# Patient Record
Sex: FEMALE | Race: WHITE | Employment: OTHER | URBAN - METROPOLITAN AREA
[De-identification: names, ages, dates, MRNs, and addresses within clinical notes are randomized per-mention and may not be internally consistent; named-entity substitution may affect disease eponyms.]

---

## 2018-05-28 PROBLEM — Z85.3 HISTORY OF BREAST CANCER: Status: ACTIVE | Noted: 2018-05-28

## 2019-02-22 ENCOUNTER — TELEPHONE (OUTPATIENT)
Dept: UROGYNECOLOGY | Facility: CLINIC | Age: 64
End: 2019-02-22

## 2019-02-22 NOTE — TELEPHONE ENCOUNTER
Spoke to pt, consult appointment scheduled 4/18 at 2p at the [facility] location, pt aware and verbalizes understanding.

## 2019-02-22 NOTE — TELEPHONE ENCOUNTER
----- Message from Leila Meyer sent at 2/22/2019 12:53 PM CST -----  Contact: Pt   Name of Who is Calling: PETER FORD [[id number]]      What is the request in detail: New patient requesting to see Dr. Briggs for a second opinion in regards to prolapse, states her [facility] doctor recommended the office. Please contact to further discuss and advise      Can the clinic reply by [facility]: No       What Number to Call Back if not in [facility]: [phone]

## 2019-04-18 ENCOUNTER — INITIAL CONSULT (OUTPATIENT)
Dept: UROGYNECOLOGY | Facility: CLINIC | Age: 64
End: 2019-04-18
Payer: COMMERCIAL

## 2019-04-18 VITALS
HEIGHT: 67 IN | SYSTOLIC BLOOD PRESSURE: 124 MMHG | BODY MASS INDEX: 22.66 KG/M2 | DIASTOLIC BLOOD PRESSURE: 82 MMHG | WEIGHT: 144.38 LBS

## 2019-04-18 DIAGNOSIS — N81.4 UTEROVAGINAL PROLAPSE: ICD-10-CM

## 2019-04-18 DIAGNOSIS — N81.6 RECTOCELE, FEMALE: ICD-10-CM

## 2019-04-18 DIAGNOSIS — R30.0 DYSURIA: Primary | ICD-10-CM

## 2019-04-18 DIAGNOSIS — N95.2 VAGINAL ATROPHY: ICD-10-CM

## 2019-04-18 DIAGNOSIS — R35.1 NOCTURIA MORE THAN TWICE PER NIGHT: ICD-10-CM

## 2019-04-18 DIAGNOSIS — R19.8 IRREGULAR BOWEL HABITS: ICD-10-CM

## 2019-04-18 DIAGNOSIS — N39.41 URGE URINARY INCONTINENCE: ICD-10-CM

## 2019-04-18 DIAGNOSIS — N81.11 CYSTOCELE, MIDLINE: ICD-10-CM

## 2019-04-18 PROCEDURE — 87086 URINE CULTURE/COLONY COUNT: CPT

## 2019-04-18 PROCEDURE — 57160 PR FIT/INSERT INTRAVAG SUPPORT DEVICE: ICD-10-PCS | Mod: S$GLB,,, | Performed by: OBSTETRICS & GYNECOLOGY

## 2019-04-18 PROCEDURE — 99205 PR OFFICE/OUTPT VISIT, NEW, LEVL V, 60-74 MIN: ICD-10-PCS | Mod: 25,S$GLB,, | Performed by: OBSTETRICS & GYNECOLOGY

## 2019-04-18 PROCEDURE — 99999 PR PBB SHADOW E&M-EST. PATIENT-LVL III: ICD-10-PCS | Mod: PBBFAC,,, | Performed by: OBSTETRICS & GYNECOLOGY

## 2019-04-18 PROCEDURE — 57160 INSERT PESSARY/OTHER DEVICE: CPT | Mod: S$GLB,,, | Performed by: OBSTETRICS & GYNECOLOGY

## 2019-04-18 PROCEDURE — 99205 OFFICE O/P NEW HI 60 MIN: CPT | Mod: 25,S$GLB,, | Performed by: OBSTETRICS & GYNECOLOGY

## 2019-04-18 PROCEDURE — 99999 PR PBB SHADOW E&M-EST. PATIENT-LVL III: CPT | Mod: PBBFAC,,, | Performed by: OBSTETRICS & GYNECOLOGY

## 2019-04-18 RX ORDER — DIPHENHYDRAMINE HCL 25 MG
CAPSULE ORAL
COMMUNITY
Start: 2015-10-11 | End: 2019-04-18 | Stop reason: SDUPTHER

## 2019-04-18 RX ORDER — ASPIRIN 81 MG/1
TABLET ORAL
COMMUNITY
Start: 2015-10-11 | End: 2019-04-18

## 2019-04-18 RX ORDER — ATORVASTATIN CALCIUM 20 MG/1
TABLET, FILM COATED ORAL
COMMUNITY
Start: 2015-10-11 | End: 2019-04-18

## 2019-04-18 NOTE — PROGRESS NOTES
[facility]   [address] [address]    Joanna Alvarez  [id number]  1955 April 18, 2019    Consulting Physician: Self, Anatoly   GYN: Shaye tena MD   Primary M.D.: Shaye Tena MD    Chief Complaint   Patient presents with    Consult     prolapse       HPI:     1)  UI:  (--) EDUAR. (+)  UUI (rare)+ increased urgency but then can't void.   (--) pads. Daytime frequency: Q 3 hours. Not hydrating enough.  Nocturia: Yes: 2/night (new).   (--) dysuria,  (+) micro hematuria--per report NEG eval at [facility] with [facility] (Dr. Zachary Melgar),  (--) frequent UTIs.  (--) complete bladder emptying. DV to help but sometimes still can't void.     2)  POP:  Present x several years.  below introitus.  Symptoms:(+)  Hip discomfort, pressure jairon with movement (cannot exercise as much).  Has been attending PT 5 times in May 2017 and Feb 2019.  Has been doing home exercises.  Not sure helping.   (--) vaginal bleeding. (--) vaginal discharge. (--) sexually active due to bulge.  (--) dyspareunia.  (--)  Vaginal dryness.  (--) vaginal estrogen use. H/o BRCA, not E2 sens.     3)  BM:  (+) constipation/straining. Has some IBS. Has some loose stool 2-3 times/month. Watches diet. Takes imodium for travel.  (--) chronic diarrhea. (--) hematochezia.  (--) fecal incontinence.  (+) fecal smearing/urgency.  (+) complete evacuation.     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     Cancer 2015    rt breast, DCIS    Cataracts, bilateral     Cystocele     Diverticulosis     Lipoma     Psoriasis    DCIS: s/p B mastectomy; neg LN; no post Tx; E2 neg    Past Surgical History  Past Surgical History:   Procedure Laterality Date    AUGMENTATION OF BREAST Bilateral 05/2015    BREAST BIOPSY Right 04/2018    BREAST BIOPSY Right 05/2015    dcis    BREAST BIOPSY Right 03/2016    BREAST SURGERY Bilateral 2015     bilateral mastectomy with implants gel implants    HERNIA REPAIR Left     LIPOMA RESECTION Right 2015    MASTECTOMY Bilateral 2015    with bilateral silicone gel implants   L inguinal hernia (mesh)    Hysterectomy: No    Past Ob History     x 4.  C/s x 0.    Largest infant weight: 8#6oz.   yes FAVD. yes episiotomy.  4th degree lac.      Gynecologic History  LMP: No LMP recorded. Patient is postmenopausal.  Age of menarche: 15 yo  Age of menopause: 56 yo  Menstrual history: h/o normal  Pap test: 2019, normal per report.  States she had 2 polyps removed  Per GYN this year--benign.  No  bleeding. History of abnormal paps: Yes - 20 yo--cryoTx resolved.  History of STIs:  No  Mammogram: has MRIs annually for h/o DCIS.  19 breast US: Bilateral post surgical changes and stable right breast post biopsy changes.   There is no sonographic evidence of malignancy.  Colonoscopy: Date of last: 2014.  Result: diverticulosis.  Repeat due:    DEXA:  Date of last: 19.  Result:  Bone mineral density measurements correspond to osteopenia overall category.  Fracture risk is moderate.  In the interval, both bone mineral density measurements are slightly decreased.  Repeat due:  Per PCP.     Family History  Family History   Problem Relation Age of Onset    Heart disease Father     Cancer Brother         bladder cancer    Heart disease Brother     Breast cancer Neg Hx     Ovarian cancer Neg Hx     Vaginal cancer Neg Hx     Endometrial cancer Neg Hx       Colon CA: No  Breast CA: No  GYN CA: No   CA: Yes - brother (bladder)    Social History  Social History     Tobacco Use   Smoking Status Never Smoker   Smokeless Tobacco Never Used     Social History     Substance and Sexual Activity   Alcohol Use Yes    Alcohol/week: 0.6 oz    Types: 1 Standard drinks or equivalent per week    Comment:  socially   .    Social History     Substance and Sexual Activity   Drug Use No     The patient is  ".  Resides in [place] [address].  Employment status: homemaker. Helps with grandchildren.     Allergies  Review of patient's allergies indicates:  No Known Allergies    Medications  Current Outpatient Medications on File Prior to Visit   Medication Sig Dispense Refill    calcium carbonate (CALCIUM 300 ORAL) Take by mouth.      pediatric multivitamin chewable tablet Take 1 tablet by mouth once daily.      [DISCONTINUED] aspirin (ECOTRIN) 81 MG EC tablet Take by mouth.      [DISCONTINUED] atorvastatin (LIPITOR) 20 MG tablet Take by mouth.      [DISCONTINUED] diphenhydrAMINE (BENADRYL) 25 mg capsule Take 25 mg by mouth every 6 (six) hours as needed for Itching.      [DISCONTINUED] diphenhydrAMINE (BENADRYL) 25 mg capsule Take by mouth.      [DISCONTINUED] meloxicam (MOBIC) 15 MG tablet Take 1 tablet (15 mg total) by mouth once daily. 30 tablet 1     No current facility-administered medications on file prior to visit.        Review of Systems A 14 point ROS was reviewed with pertinent positives as noted above in the history of present illness.      Constitutional: negative  Eyes: negative  Endocrine: negative  Gastrointestinal: negative  Cardiovascular: negative  Respiratory: negative  Allergic/Immunologic: negative  Integumentary: negative  Psychiatric: negative  Musculoskeletal: negative   Ear/Nose/Throat: negative  Neurologic: negative  Genitourinary: SEE HPI  Hematologic/Lymphatic: negative   Breast: negative    Urogynecologic Exam  /82 (BP Location: Right arm, Patient Position: Sitting, BP Method: Medium (Manual))   Ht 5' 7" (1.702 m)   Wt 65.5 kg (144 lb 6.4 oz)   BMI 22.62 kg/m²     GENERAL APPEARANCE:  The patient is well-developed, well-nourished.  Neck:  Supple with no thyromegaly, no carotid bruits.  Heart:  Regular rate and rhythm, no murmurs, rubs or gallops.  Lungs:  Clear.  No CVA tenderness.  Abdomen:  Soft, nontender, nondistended, no hepatosplenomegaly.  Incisions:  " none    PELVIC:    External genitalia:  Normal Bartholins, Skenes and labia bilaterally.    Urethra:  No caruncle, diverticulum or masses.  (+) hypermobility.    Vagina:  Atrophy (--) , no bladder masses or tender, no discharge.    Cervix:  normal appearance  Uterus: normal size, contour, position, consistency, mobility, non-tender  Adnexa: Not palpable.    POP-Q:  Aa +3; Ba +3; C 0; Ap -1; Bp -1; D -4.  Genital hiatus 4, perineal body 2, total vaginal length 10-11.    NEUROLOGIC:  Cranial nerves 2 through 12 intact.  Strength 5/5.  DTRs 2+ lower extremities.  S2 through 4 normal.  Sacral reflexes intact.    EXT: RODRIGUEZ, 2+ pulses bilaterally, no C/C/E    COUGH STRESS TEST:  negative  KEGEL: 1 /5    RECTAL:    External:  Normal, (--) hemorrhoids, (--) dovetailing.   Internal:  deferred    PVR: 50 mL    The patient was fit with #4 ring + support pessary.  She was able to tolerate the device comfortabley with bending, squatting, valsalva.  She was able to demonstrate independent removal and placement.  She tolerated the procedure well.        Impression    1. Dysuria    2. Cystocele, midline    3. Rectocele, female    4. Uterovaginal prolapse    5. Urge urinary incontinence    6. Nocturia more than twice per night    7. Irregular bowel habits    8. Vaginal atrophy        Initial Plan  The patient was counseled regarding these issues. The patient was given a summary sheet containing each of these issues with possible options for evaluation and management. When appropriate, we also reviewed computer-generated diagrams specific to their diagnoses..  All questions were addressed to the patient's satisfaction.    1) stage 3 cystocele, stage 2 uterine prolapse/rectocele:  --discussed pathophysiology  --trial of pessary    PESSARY CARE: Remove pessary as frequently as nightly and as infrequently as every 4 weeks.  Remove before intercourse.  May need to remove before bowel movements if straining dislodges.   Wash with soap and  water (no ).  Replace using small amount of water-based lubricant (like KY jelly) at end being introduced into vagina.      --surgical option: laparoscopic/robotic hysterectomy, possible removal of tubes/ovaries, sacral colpopexy  --given mesh information  --if surgery: need to do bladder testing (urodynamics) to see if need sling for hidden stress leakage  --if surgery: pelvic US to define anatomy, preop visit with PCP, preop labs    2)  Nocturia (nighttime urination):  --see if pessary helps  --stop fluids 2 hours before bed/no water by bed.  If have leg swelling:  Elevate feet above chest x 1 hour before bed to get excess fluid off.  Can also use support hose (knee highs).      3)  Urge incontinence, rare:  --urine C&S  --Empty bladder every 3 hours.  Empty well: wait a minute, lean forward on toilet.    --Avoid dietary irritants (see sheet).  Keep diary x 3-5 days to determine your irritants.  --continue PT exercises at home:  Vivian Velásquez:  [facility] in [place] (p) [phone].    --URGE: consider medication in future. Takes 2-4 weeks to see if will have effect.  For dry mouth: get sour, sugar free lozenge or gum.    --if surgery: need to do bladder testing (urodynamics) to see if need sling for hidden stress leakage    4)  Irritable bowel, intermittent constipation/diarrhea:  --keep food diary; avoid triggers  --look at diet for exacerbating foods and decrease; keep diary (see handout)  --work on stress control; this can affect bowel movements: cognitive behavioral therapy  --increase hydration: drink 3-4 bottles of water/day in addition to what you drink with meals  --watch caffeine intake; make sure to replace lost water after/before drinking caffeine  --get regular exercise  --increase fiber: Start daily fiber.  Take 1 tsp of fiber powder (psyllium or other sugar-free powder).  Mix in 8 oz of water.  Take x 3-5 days.  Then, increase fiber by 1 tsp every 3-5 days until stool is  easy to pass.  Stop and continue at that dose.   Do not exceed 6 tsps/day. GOAL:  More well-formed stool (one continuous well-formed piece vs. Pellets vs loose) and minimize straining with initiation.    --consider daily magnesium oxide 400 IU or mg a day  --start daily probiotic pill (lactobacillis, acidophilus)--any brand ok  --use stool softener (ducosate sodium) 1-2 times/day  --if having trouble completing evacuation:  Get small footstool (less than 6 inches) or large book and place feet on when using bathroom   --consider PT, amitiza if refractory; consider evaluation for low motility syndrome if refractory  --rectocele: stage 2    5)  Vaginal atrophy (dryness): Use REPLENS or REFRESH OTC: 1/2 applicator full in vagina twice a week.    --consider use of vaginal estrogen if ok with oncology (h/o DCIS, E2 neg)    6)  Let us know if need anything else or would like surgery.     Approximately 60 min were spent in consult, 75 % in discussion.  Patient's  was present for entire discussion.    Thank you for requesting consultation of your patient.  I look forward to participating in their care.    Arianna Briggs  Female Pelvic Medicine and Reconstructive Surgery  [facility] [place], [place]

## 2019-04-18 NOTE — PATIENT INSTRUCTIONS
Bladder Irritants  Certain foods and drinks have been associated with worsening symptoms of urinary frequency, urgency, urge incontinence, or bladder pain. If you suffer from any of these conditions, you may wish to try eliminating one or more of these foods from your diet and see if your symptoms improve. If bladder symptoms are related to dietary factors, strict adherence to a diet thateliminates the food should bring marked relief in 10 days. Once you are feeling better, you can begin to add foods back into your diet, one at a time. If symptoms return, you will be able to identify the irritant. As you add foods back to your diet it is very important that you drink significant amounts of water.    -----------------------------------------------------------------------------------------------  List of Common Bladder Irritants*  Alcoholic beverages  Apples and apple juice  Cantaloupe  Carbonated beverages  Chili and spicy foods  Chocolate  Citrus fruit  Coffee (including decaffeinated)  Cranberries and cranberry juice  Grapes  Guava  Milk Products: milk, cheese, cottage cheese, yogurt, ice cream  Peaches  Pineapple  Plums  Strawberries  Sugar especially artificial sweeteners, saccharin, aspartame, corn sweeteners, honey, fructose, sucrose, lactose  Tea  Tomatoes and tomato juice  Vitamin B complex  Vinegar  *Most people are not sensitive to ALL of these products; your goal is to find the foods that make YOUR symptoms worse.  ---------------------------------------------------------------------------------------------------    Low-acid fruit substitutions include apricots, papaya, pears and watermelon. Coffee drinkers can drink Kava or other lowacid instant drinks. Tea drinkers can substitute non-citrus herbal and sun brewed teas. Calcium carbonate co-buffered with calcium ascorbate can be substituted for Vitamin C. Prelief is a dietary supplement that works as an acid blocker for the bladder.    Where to get more  information:        Overcoming Bladder Disorders by Cookie Shepard and Chrissie Iniguez, 1990        You Dont Have to Live with Cystitis! By Raegan Cornell, 1988  · http://www.urologymanagement.org/oab    ------------------------------------------------------------------------------    Fiber Information Sheet  Your doctor has recommended that you follow a high fiber diet. The addition of fiber to your diet can make an enormous difference in your bowel control and regularity. Fiber helps people whether they lose stool or have trouble with constipation. Fiber works by bulking the stool and keeping it formed, yet making the movement soft and easy to pass. Fiber helps keep moisture within the stool so that neither diarrhea nor hard stool occurs. Fiber makes the bowels work more regularly, but it is not a laxative. An additional bonus from eating a high fiber diet is that your risk of cancer is reduced, too.    Most of us eat some high fiber foods already, but nearly all of us do not eat the necessary amount. For example, a slice of whole wheat bread contains only about 10% of the daily recommended amount of fiber. This means if you are relying on only whole wheat bread to meet the recommended fiber requirements, you would need to eat  between 10-18 slices every day! Please note that fiber is NOT in any meat or dairy product. It is only found in grains, vegetables and fruits. The recommended daily fiber intake is 20-25 grams. Foods having high fiber content include:     Fiber One Cereal, ½ cup 13.0 g   Maria beans, ¾ cup 10.4 g   Wheat bran cereal, 1 oz 10.0 g   Kidney beans, ¾ cup 9.3 g   All Bran Cereal, ½ cup 6.0 g   Oat Bran Cereal, hot, 1 oz 4.0 g   Banana, 1medium 3.8 g   Canned pears, ½ cup 3.7 g   3 prunes or ¼ cup raisins 3.5 g   Whole Wheat Total, 1 cup 3.0 g   Carrots, ½ cup 3.2 g   Apple, small 2.8 g   Broccoli, ½ cup 2.8 g   Cauliflower, ½ cup 2.6 g   Oatmeal, 1 oz 2.5 g    Whole Wheat Toast 2.0 g   Cheerios, 1 1/3 cup 2.0 g   Baked potato with skin 2.0 g   Corn, ½ cup 1.9 g   Popcorn, 3 cups 1.9 g   Orange, medium 1.9 g   Granola bar 1.0 g   Lettuce, ½ cup 0.9 g    If you dont think that you can get enough fiber through your everyday diet, there are many good fiber supplements you can take along with eating your high fiber diet. Some of these are: Metamucil (1 heaping teaspoon or 1-2 wafers), Citrucel (1 tablespoon), Fiberall (1-2 wafers or 1 teaspoon), Perdium (2 rounded teaspoons) and 1-2 teaspoons unprocessed bran (to mix with foods)    You may need to use the fiber supplement up to 3-4 times daily to produce normal elimination. Please follow specific package directions or call us for help in regulating the dose. You may notice some bloating and/or increased gas at first. These symptoms can be relieved by adding fiber to your diet slowly. Once your body gets used to this increased fiber, these symptoms will go away.   -0------------------------------------------------------------------------------    1) stage 3 cystocele, stage 2 uterine prolapse/rectocele:  --discussed pathophysiology  --trial of pessary    PESSARY CARE: Remove pessary as frequently as nightly and as infrequently as every 4 weeks.  Remove before intercourse.  May need to remove before bowel movements if straining dislodges.   Wash with soap and water (no ).  Replace using small amount of water-based lubricant (like KY jelly) at end being introduced into vagina.      --surgical option: laparoscopic/robotic hysterectomy, possible removal of tubes/ovaries, sacral colpopexy  --given mesh information  --if surgery: need to do bladder testing (urodynamics) to see if need sling for hidden stress leakage  --if surgery: pelvic US to define anatomy, preop visit with PCP, preop labs    2)  Nocturia (nighttime urination):  --see if pessary helps  --stop fluids 2 hours before bed/no water by bed.  If have leg  swelling:  Elevate feet above chest x 1 hour before bed to get excess fluid off.  Can also use support hose (knee highs).      3)  Urge incontinence, rare:  --urine C&S  --Empty bladder every 3 hours.  Empty well: wait a minute, lean forward on toilet.    --Avoid dietary irritants (see sheet).  Keep diary x 3-5 days to determine your irritants.  --continue PT exercises at home:  Vivian Velásquez:  [facility] in [place] (p) [phone].    --URGE: consider medication in future. Takes 2-4 weeks to see if will have effect.  For dry mouth: get sour, sugar free lozenge or gum.    --if surgery: need to do bladder testing (urodynamics) to see if need sling for hidden stress leakage    4)  Irritable bowel, intermittent constipation/diarrhea:  --keep food diary; avoid triggers  --look at diet for exacerbating foods and decrease; keep diary (see handout)  --work on stress control; this can affect bowel movements: cognitive behavioral therapy  --increase hydration: drink 3-4 bottles of water/day in addition to what you drink with meals  --watch caffeine intake; make sure to replace lost water after/before drinking caffeine  --get regular exercise  --increase fiber: Start daily fiber.  Take 1 tsp of fiber powder (psyllium or other sugar-free powder).  Mix in 8 oz of water.  Take x 3-5 days.  Then, increase fiber by 1 tsp every 3-5 days until stool is easy to pass.  Stop and continue at that dose.   Do not exceed 6 tsps/day. GOAL:  More well-formed stool (one continuous well-formed piece vs. Pellets vs loose) and minimize straining with initiation.    --consider daily magnesium oxide 400 IU or mg a day  --start daily probiotic pill (lactobacillis, acidophilus)--any brand ok  --use stool softener (ducosate sodium) 1-2 times/day  --if having trouble completing evacuation:  Get small footstool (less than 6 inches) or large book and place feet on when using bathroom   --consider PT, amitiza if refractory; consider  evaluation for low motility syndrome if refractory  --rectocele: stage 2    5)  Vaginal atrophy (dryness): Use REPLENS or REFRESH OTC: 1/2 applicator full in vagina twice a week.    --consider use of vaginal estrogen if ok with oncology (h/o DCIS, E2 neg)    6)  Let us know if need anything else or would like surgery.

## 2019-04-20 LAB — BACTERIA UR CULT: NO GROWTH

## 2021-01-22 ENCOUNTER — PATIENT MESSAGE (OUTPATIENT)
Dept: ADMINISTRATIVE | Facility: OTHER | Age: 66
End: 2021-01-22

## 2021-03-15 ENCOUNTER — TELEPHONE (OUTPATIENT)
Dept: HEMATOLOGY/ONCOLOGY | Facility: CLINIC | Age: 66
End: 2021-03-15

## 2021-04-28 ENCOUNTER — OFFICE VISIT (OUTPATIENT)
Dept: HEMATOLOGY/ONCOLOGY | Facility: CLINIC | Age: 66
End: 2021-04-28
Payer: MEDICARE

## 2021-04-28 VITALS
HEART RATE: 65 BPM | WEIGHT: 142.63 LBS | SYSTOLIC BLOOD PRESSURE: 104 MMHG | OXYGEN SATURATION: 97 % | BODY MASS INDEX: 22.39 KG/M2 | TEMPERATURE: 99 F | HEIGHT: 67 IN | RESPIRATION RATE: 18 BRPM | DIASTOLIC BLOOD PRESSURE: 50 MMHG

## 2021-04-28 DIAGNOSIS — D05.11 DUCTAL CARCINOMA IN SITU (DCIS) OF RIGHT BREAST: Primary | ICD-10-CM

## 2021-04-28 DIAGNOSIS — Z78.0 POST-MENOPAUSAL: ICD-10-CM

## 2021-04-28 DIAGNOSIS — Z90.13 H/O BILATERAL MASTECTOMY: ICD-10-CM

## 2021-04-28 PROCEDURE — 99204 PR OFFICE/OUTPT VISIT, NEW, LEVL IV, 45-59 MIN: ICD-10-PCS | Mod: S$PBB,,, | Performed by: INTERNAL MEDICINE

## 2021-04-28 PROCEDURE — 99213 OFFICE O/P EST LOW 20 MIN: CPT | Mod: PBBFAC,PN | Performed by: INTERNAL MEDICINE

## 2021-04-28 PROCEDURE — 99204 OFFICE O/P NEW MOD 45 MIN: CPT | Mod: S$PBB,,, | Performed by: INTERNAL MEDICINE

## 2021-04-28 PROCEDURE — 99999 PR PBB SHADOW E&M-EST. PATIENT-LVL III: ICD-10-PCS | Mod: PBBFAC,,, | Performed by: INTERNAL MEDICINE

## 2021-04-28 PROCEDURE — 99999 PR PBB SHADOW E&M-EST. PATIENT-LVL III: CPT | Mod: PBBFAC,,, | Performed by: INTERNAL MEDICINE

## 2021-04-28 RX ORDER — METOPROLOL TARTRATE 25 MG/1
TABLET, FILM COATED ORAL
COMMUNITY
Start: 2021-02-08

## 2021-04-28 RX ORDER — METOPROLOL SUCCINATE 25 MG/1
TABLET, EXTENDED RELEASE ORAL
COMMUNITY
Start: 2021-01-25

## 2021-04-28 RX ORDER — PREGABALIN 75 MG/1
75 CAPSULE ORAL
COMMUNITY
Start: 2020-09-03 | End: 2021-09-03

## 2021-04-28 RX ORDER — NITROFURANTOIN (MACROCRYSTALS) 100 MG/1
100 CAPSULE ORAL
COMMUNITY

## 2021-04-28 RX ORDER — APIXABAN 5 MG/1
5 TABLET, FILM COATED ORAL 2 TIMES DAILY
COMMUNITY
Start: 2021-03-24

## 2021-04-28 RX ORDER — FLECAINIDE ACETATE 100 MG/1
100 TABLET ORAL EVERY 12 HOURS
COMMUNITY
Start: 2021-01-25

## 2021-04-28 RX ORDER — ASPIRIN 325 MG
325 TABLET, DELAYED RELEASE (ENTERIC COATED) ORAL
COMMUNITY
Start: 2020-09-02 | End: 2021-09-02

## 2021-04-28 RX ORDER — AZITHROMYCIN 250 MG/1
TABLET, FILM COATED ORAL
COMMUNITY
Start: 2021-01-30

## 2021-05-10 ENCOUNTER — PATIENT MESSAGE (OUTPATIENT)
Dept: RESEARCH | Facility: HOSPITAL | Age: 66
End: 2021-05-10

## 2021-07-15 PROBLEM — M81.0 SENILE OSTEOPOROSIS: Status: ACTIVE | Noted: 2021-07-15

## 2021-12-22 ENCOUNTER — TELEPHONE (OUTPATIENT)
Dept: INFUSION THERAPY | Facility: HOSPITAL | Age: 66
End: 2021-12-22
Payer: MEDICARE

## 2022-09-01 ENCOUNTER — PATIENT MESSAGE (OUTPATIENT)
Dept: HEMATOLOGY/ONCOLOGY | Facility: CLINIC | Age: 67
End: 2022-09-01
Payer: MEDICARE

## 2023-06-30 ENCOUNTER — TELEPHONE (OUTPATIENT)
Dept: UROLOGY | Facility: CLINIC | Age: 68
End: 2023-06-30
Payer: MEDICARE

## 2023-06-30 NOTE — TELEPHONE ENCOUNTER
Patient is previous patient and would like to see Dr. Briggs for POP. Appt scheduled.    ----- Message from Neida Joshi sent at 6/30/2023 10:22 AM CDT -----  Regarding: Sooner Appt Request  Who Is Calling : PETER FORD [[id number]]        Reason For The Call: Patient is requesting a sooner appointment.  Patient declined first available and does not want to be added to the waitlist.  Please contact the patient to schedule.        Preferred Contact Method: [phone]        Additional Information:

## 2023-07-03 ENCOUNTER — OFFICE VISIT (OUTPATIENT)
Dept: UROGYNECOLOGY | Facility: CLINIC | Age: 68
End: 2023-07-03
Payer: MEDICARE

## 2023-07-03 VITALS
SYSTOLIC BLOOD PRESSURE: 153 MMHG | WEIGHT: 137.38 LBS | BODY MASS INDEX: 21.51 KG/M2 | DIASTOLIC BLOOD PRESSURE: 69 MMHG

## 2023-07-03 DIAGNOSIS — R35.1 NOCTURIA MORE THAN TWICE PER NIGHT: ICD-10-CM

## 2023-07-03 DIAGNOSIS — N39.41 URINARY INCONTINENCE, URGE: Primary | ICD-10-CM

## 2023-07-03 DIAGNOSIS — N81.11 CYSTOCELE, MIDLINE: ICD-10-CM

## 2023-07-03 DIAGNOSIS — N81.4 UTEROVAGINAL PROLAPSE: ICD-10-CM

## 2023-07-03 DIAGNOSIS — K59.00 CONSTIPATION, UNSPECIFIED CONSTIPATION TYPE: ICD-10-CM

## 2023-07-03 DIAGNOSIS — R19.8 IRREGULAR BOWEL HABITS: ICD-10-CM

## 2023-07-03 DIAGNOSIS — N81.6 RECTOCELE, FEMALE: ICD-10-CM

## 2023-07-03 DIAGNOSIS — Z85.3 HISTORY OF BREAST CANCER: ICD-10-CM

## 2023-07-03 DIAGNOSIS — N95.2 VAGINAL ATROPHY: ICD-10-CM

## 2023-07-03 PROCEDURE — 99999 PR PBB SHADOW E&M-EST. PATIENT-LVL V: CPT | Mod: PBBFAC,,, | Performed by: OBSTETRICS & GYNECOLOGY

## 2023-07-03 PROCEDURE — 99999 PR PBB SHADOW E&M-EST. PATIENT-LVL V: ICD-10-PCS | Mod: PBBFAC,,, | Performed by: OBSTETRICS & GYNECOLOGY

## 2023-07-03 PROCEDURE — 99203 OFFICE O/P NEW LOW 30 MIN: CPT | Mod: S$PBB,,, | Performed by: OBSTETRICS & GYNECOLOGY

## 2023-07-03 PROCEDURE — 99215 OFFICE O/P EST HI 40 MIN: CPT | Mod: PBBFAC | Performed by: OBSTETRICS & GYNECOLOGY

## 2023-07-03 PROCEDURE — 99203 PR OFFICE/OUTPT VISIT, NEW, LEVL III, 30-44 MIN: ICD-10-PCS | Mod: S$PBB,,, | Performed by: OBSTETRICS & GYNECOLOGY

## 2023-07-03 PROCEDURE — 51798 US URINE CAPACITY MEASURE: CPT | Mod: PBBFAC | Performed by: OBSTETRICS & GYNECOLOGY

## 2023-07-03 RX ORDER — COVID-19 MOLECULAR TEST ASSAY
KIT MISCELLANEOUS
COMMUNITY

## 2023-07-03 RX ORDER — PREDNISONE 10 MG/1
TABLET ORAL
COMMUNITY

## 2023-07-03 RX ORDER — DENOSUMAB 60 MG/ML
60 INJECTION SUBCUTANEOUS
COMMUNITY

## 2023-07-03 RX ORDER — DOXYCYCLINE 100 MG/1
CAPSULE ORAL
COMMUNITY

## 2023-07-03 RX ORDER — ERGOCALCIFEROL (VITAMIN D2) 10 MCG
250 TABLET ORAL
COMMUNITY

## 2023-07-03 RX ORDER — CALCIUM CARB/VITAMIN D3/VIT K1 500-500-40
TABLET,CHEWABLE ORAL
COMMUNITY

## 2023-07-03 RX ORDER — CEPHALEXIN 500 MG/1
CAPSULE ORAL
COMMUNITY
Start: 2023-04-22

## 2023-07-03 RX ORDER — APIXABAN 2.5 MG/1
2.5 TABLET, FILM COATED ORAL 2 TIMES DAILY
COMMUNITY
Start: 2023-06-27

## 2023-07-03 RX ORDER — MUPIROCIN 20 MG/G
OINTMENT TOPICAL
COMMUNITY

## 2023-07-03 RX ORDER — ERYTHROMYCIN 5 MG/G
OINTMENT OPHTHALMIC
COMMUNITY
Start: 2022-11-28

## 2023-07-03 RX ORDER — PROCHLORPERAZINE MALEATE 10 MG
TABLET ORAL
COMMUNITY

## 2023-07-03 RX ORDER — FLECAINIDE ACETATE 50 MG/1
TABLET ORAL
COMMUNITY

## 2023-07-03 RX ORDER — CHOLESTYRAMINE 4 G/5.5G
POWDER, FOR SUSPENSION ORAL
COMMUNITY
Start: 2023-03-16

## 2023-07-03 RX ORDER — FLECAINIDE ACETATE 100 MG/1
100 TABLET ORAL
COMMUNITY

## 2023-07-03 RX ORDER — CALCIUM CARBONATE 500(1250)
1 TABLET ORAL
COMMUNITY

## 2023-07-03 RX ORDER — CHOLESTYRAMINE 4 G/4.8G
1 POWDER, FOR SUSPENSION ORAL
COMMUNITY

## 2023-07-03 RX ORDER — CEPHALEXIN 500 MG/1
CAPSULE ORAL
COMMUNITY

## 2023-07-03 RX ORDER — FLECAINIDE ACETATE 100 MG/1
TABLET ORAL
COMMUNITY

## 2023-07-03 RX ORDER — ONDANSETRON 4 MG/1
TABLET, FILM COATED ORAL
COMMUNITY

## 2023-07-03 RX ORDER — BENZONATATE 200 MG/1
CAPSULE ORAL
COMMUNITY

## 2023-07-03 NOTE — PROGRESS NOTES
[facility] - UROGYNECOLOGY  [address] [address]    Joanna Alvarez  [id number]  1955  July 3, 2023    Consulting Physician: Anatoly Caro   GYN: Shaye Tena MD   Primary M.D.: Jj Gerardo MD    Chief Complaint   Patient presents with    Follow-up     The patient's last visit with me was on 4/18/2019.    HPI 2019:     1)  UI:  (--) EDUAR. (+)  UUI (rare)+ increased urgency but then can't void.   (--) pads. Daytime frequency: Q 3 hours. Not hydrating enough.  Nocturia: Yes: 2/night (new).   (--) dysuria,  (+) micro hematuria--per report NEG eval at  with [facility] (Dr. Zachary Melgar),  (--) frequent UTIs.  (--) complete bladder emptying. DV to help but sometimes still can't void.     2)  POP:  Present x several years.  below introitus.  Symptoms:(+)  Hip discomfort, pressure jairon with movement (cannot exercise as much).  Has been attending PT 5 times in May 2017 and Feb 2019.  Has been doing home exercises.  Not sure helping.   (--) vaginal bleeding. (--) vaginal discharge. (--) sexually active due to bulge.  (--) dyspareunia.  (--)  Vaginal dryness.  (--) vaginal estrogen use. H/o BRCA, not E2 sens.     3)  BM:  (+) constipation/straining. Has some IBS. Has some loose stool 2-3 times/month. Watches diet. Takes imodium for travel.  (--) chronic diarrhea. (--) hematochezia.  (--) fecal incontinence.  (+) fecal smearing/urgency.  (+) complete evacuation.     TODAY:  1) stage 3 cystocele, stage 2 uterine prolapse/rectocele:  --using pessary: would like new one--looks dirty   --no major bleeding, discharge   --did have some vaginal sores, discharge on ORION med (Tigresso)--better now since dose reduced   --takes out every PM, replaces in AM   --not using any lube; places pessary with water  --feels like POP is worse    2)  Nocturia (nighttime urination):  --baseline: 2x/PM  --doesn't wear pessary at night    3)  Urge incontinence, rare:  --baseline:  (--) EDUAR. (+)  UUI (rare)+ increased urgency  but then can't void.   (--) pads. Daytime frequency: Q 3 hours. Not hydrating enough.  Nocturia: Yes: 2/night (new).   (--) dysuria  --currently: no major UI; freq Q2h; not drinking enough water; no dysuria; +complete emptying with pessary--PV to help; no freq UTIs    4)  Irritable bowel, intermittent constipation/diarrhea:  --taking cholestyramine with better control  --no major straining    5)  Vaginal atrophy (dryness): not using anything--afraid to use something  --was Rx'd imvexxy--didn't start due to worry; oncologist approved    Past Medical History  Past Medical History:   Diagnosis Date    Amnesia, global, transient     Breast cancer 2015    right  dcis     Cancer     rt breast, DCIS    Cataracts, bilateral     Cystocele     Diverticulosis     Lipoma     Psoriasis    DCIS: s/p B mastectomy; neg LN; no post Tx; E2 neg  Lung CA: followed at [facility] [place]; 2022 - Non-small cell Lung cancer - Stage 1 - s/p lobectomy, on oral med Tagrisso   Afib: taking flecainide + eliquis    Past Surgical History  Past Surgical History:   Procedure Laterality Date    AUGMENTATION OF BREAST Bilateral 2015    BREAST BIOPSY Right 2018    BREAST BIOPSY Right 2015    dcis    BREAST BIOPSY Right 2016    BREAST SURGERY Bilateral     bilateral mastectomy with implants gel implants    HERNIA REPAIR Left     LIPOMA RESECTION Right     MASTECTOMY Bilateral     with bilateral silicone gel implants   L inguinal hernia (mesh)  L hip replacement    Hysterectomy: No    Past Ob History     x 4.  C/s x 0.    Largest infant weight: 8#6oz.   yes FAVD. yes episiotomy.  4th degree lac.      Gynecologic History  LMP: No LMP recorded. Patient is postmenopausal.  Age of menarche: 15 yo  Age of menopause: 56 yo  Menstrual history: h/o normal  Pap test: , ASCUS/HPV NEG.  No  bleeding. History of abnormal paps: Yes - 20 yo--cryoTx resolved.  History of STIs:  No  Mammogram: has MRIs annually for h/o  DCIS.  3/2023 breast US: Bilateral post surgical changes and stable right breast post biopsy changes. Unchanged.   There is no sonographic evidence of malignancy.  Colonoscopy: Date of last: 1/2014.  Result: diverticulosis.  Repeat due:  2019  DEXA:  Date of last: 2022.  Result: normal.  Repeat due:  Per PCP.     Family History  Family History   Problem Relation Age of Onset    Heart disease Father     Cancer Brother         bladder cancer    Heart disease Brother     Breast cancer Neg Hx     Ovarian cancer Neg Hx     Vaginal cancer Neg Hx     Endometrial cancer Neg Hx       Colon CA: No  Breast CA: No  GYN CA: No   CA: Yes - brother (bladder)    Social History  Social History     Tobacco Use   Smoking Status Never   Smokeless Tobacco Never     Social History     Substance and Sexual Activity   Alcohol Use Yes    Alcohol/week: 1.0 standard drink    Types: 1 Standard drinks or equivalent per week    Comment:  socially   .    Social History     Substance and Sexual Activity   Drug Use No     The patient is .  Resides in [place] [address].  Employment status: homemaker. Helps with grandchildren.     Allergies  Review of patient's allergies indicates:  No Known Allergies    Medications  Current Outpatient Medications on File Prior to Visit   Medication Sig Dispense Refill    apixaban (ELIQUIS) 2.5 mg Tab Take 2.5 mg by mouth.      cholecalciferol, vitamin D3, 10 mcg (400 unit) Cap capsule Vitamin D3   2 daily      denosumab (PROLIA) 60 mg/mL Syrg Inject 60 mg into the skin.      erythromycin (ROMYCIN) ophthalmic ointment APPLY 1 APPLICATION TO EYELIDS EVERY BEDTIME      flecainide (TAMBOCOR) 100 MG Tab Take 100 mg by mouth every 12 (twelve) hours.      metoprolol succinate (TOPROL-XL) 25 MG 24 hr tablet TAKE 1 TABLET BY MOUTH EVERY DAY AS NEEDED FOR PALPITATIONS      osimertinib (TAGRISSO) 80 mg Tab Take 80 mg by mouth.      osimertinib 40 mg Tab Tagrisso 40 mg tablet   Take 1 tablet every day by oral route.       pediatric multivitamin chewable tablet Take 1 tablet by mouth once daily.      PREVALITE 4 gram PwPk       azithromycin (Z-CINDY) 250 MG tablet TAKE AS DIRECTED ON PACK      benzonatate (TESSALON) 200 MG capsule benzonatate 200 mg capsule   TAKE 1 CAPSULE BY MOUTH THREE TIMES A DAY AS NEEDED      calcium carbonate (CALCIUM 300 ORAL) Take by mouth.      calcium carbonate (OS-VINNY) 500 mg calcium (1,250 mg) tablet Take 1 tablet by mouth.      calcium carbonate 195 mg calcium (500 mg) Chew Take 1 tablet by mouth once daily.      cephALEXin (KEFLEX) 500 MG capsule cephalexin 500 mg capsule   TAKE 4 CAPSULES BY MOUTH 1 HOUR PRIOR TO APPOINTMENT      cephALEXin (KEFLEX) 500 MG capsule TAKE 4 CAPSULES BY MOUTH 1 HOUR PRIOR TO APPOINTMENT      cholestyramine-aspartame (QUESTRAN LIGHT) 4 gram PwPk Take 1 packet by mouth.      COVID-19 antigen test (BINAXNOW COVID-19 AG SELF TEST) Kit BinaxNOW COVID-19 Ag Self Test kit   USE AS DIRECTED      diclofenac sodium (VOLTAREN) 1 % Gel Apply 4 g topically 4 (four) times daily. 5 g 1    doxycycline (VIBRAMYCIN) 100 MG Cap doxycycline hyclate 100 mg capsule   TAKE 1 CAPSULE BY MOUTH TWICE A DAY      ELIQUIS 2.5 mg Tab Take 2.5 mg by mouth 2 (two) times daily.      ELIQUIS 5 mg Tab Take 5 mg by mouth 2 (two) times daily.      ergocalciferol, vitamin D2, 10 mcg (400 unit) Tab Take 250 mcg by mouth.      flecainide (TAMBOCOR) 100 MG Tab Take 100 mg by mouth.      flecainide (TAMBOCOR) 100 MG Tab flecainide 100 mg tablet   Take 1 tablet twice a day by oral route.      flecainide (TAMBOCOR) 50 MG Tab flecainide 50 mg tablet   TAKE 1 TABLET BY MOUTH DAILY      meloxicam (MOBIC) 15 MG tablet Take 1 tablet (15 mg total) by mouth once daily. 30 tablet 1    metoprolol tartrate (LOPRESSOR) 25 MG tablet SMARTSI Tablet(s) By Mouth As Needed      mupirocin (BACTROBAN) 2 % ointment mupirocin 2 % topical ointment      nitrofurantoin (MACRODANTIN) 100 MG capsule Take 100 mg by mouth.       ondansetron (ZOFRAN) 4 MG tablet ondansetron HCl 4 mg tablet   TAKE 1 TABLET BY MOUTH EVERY 8 HOURS AS NEEDED FOR NAUSEA FOR UP TO 7 DAYS      predniSONE (DELTASONE) 10 MG tablet prednisone 10 mg tablet      pregabalin (LYRICA) 75 MG capsule Take 75 mg by mouth.      prochlorperazine (COMPAZINE) 10 MG tablet prochlorperazine maleate 10 mg tablet   TAKE 1 TABLET BY MOUTH EVERY 8 HOURS AS NEEDED FOR NAUSEA       No current facility-administered medications on file prior to visit.       Review of Systems A 14 point ROS was reviewed with pertinent positives as noted above in the history of present illness.      Constitutional: negative  Eyes: negative  Endocrine: negative  Gastrointestinal: negative  Cardiovascular: negative  Respiratory: negative  Allergic/Immunologic: negative  Integumentary: negative  Psychiatric: negative  Musculoskeletal: negative   Ear/Nose/Throat: negative  Neurologic: negative  Genitourinary: SEE HPI  Hematologic/Lymphatic: negative   Breast: negative    Urogynecologic Exam  BP (!) 153/69 (BP Location: Left arm, Patient Position: Sitting, BP Method: Medium (Automatic))   Wt 62.3 kg (137 lb 5.6 oz)   BMI 21.51 kg/m²     GENERAL APPEARANCE:  The patient is well-developed, well-nourished.  Neck:  Supple with no thyromegaly, no carotid bruits.  Heart:  Regular rate and rhythm, no murmurs, rubs or gallops.  Lungs:  Clear.  No CVA tenderness.  Abdomen:  Soft, nontender, nondistended, no hepatosplenomegaly.  Incisions:  none    PELVIC:    External genitalia:  Normal Bartholins, Skenes and labia bilaterally.    Urethra:  No caruncle, diverticulum or masses.  (+) hypermobility.    Vagina:  #4 ring + support pessary. Removed, cleaned, replaced. Atrophy (--) , no bladder masses or tender, no discharge.    Cervix:  normal appearance  Uterus: normal size, contour, position, consistency, mobility, non-tender  Adnexa: Not palpable.    POP-Q:  Aa +3; Ba +3; C 0; Ap -1; Bp -1; D -4.  Genital hiatus 4, perineal  body 2, total vaginal length 10-11.    NEUROLOGIC:  Cranial nerves 2 through 12 intact.  Strength 5/5.  DTRs 2+ lower extremities.  S2 through 4 normal.  Sacral reflexes intact.    EXT: RODRIGUEZ, 2+ pulses bilaterally, no C/C/E    COUGH STRESS TEST:  negative  KEGEL: 1 /5    RECTAL:    External:  Normal, (--) hemorrhoids, (--) dovetailing.   Internal:  deferred    PVR: 50 mL per US    Impression    1. Urinary incontinence, urge    2. Constipation, unspecified constipation type    3. Cystocele, midline    4. Rectocele, female    5. Uterovaginal prolapse    6. Vaginal atrophy    7. Nocturia more than twice per night    8. Irregular bowel habits    9. History of breast cancer          Initial Plan  The patient was counseled regarding these issues. The patient was given a summary sheet containing each of these issues with possible options for evaluation and management. When appropriate, we also reviewed computer-generated diagrams specific to their diagnoses..  All questions were addressed to the patient's satisfaction.    1) stage 3 cystocele, stage 2 uterine prolapse/rectocele:  --discussed pathophysiology  --continue pessary--will help you order new device per patient request    PESSARY CARE: Remove pessary as frequently as nightly and as infrequently as every 4 weeks.  Remove before intercourse.  May need to remove before bowel movements if straining dislodges.   Wash with soap and water (no ).  Replace using small amount of water-based lubricant (like KY jelly) at end being introduced into vagina.      --surgical option: laparoscopic/robotic hysterectomy, possible removal of tubes/ovaries, sacral colpopexy  --given mesh information  --if surgery: need to do bladder testing (urodynamics) to see if need sling for hidden stress leakage  --if surgery: pelvic US to define anatomy, preop visit with PCP, preop labs    2)  Nocturia (nighttime urination):  --see if pessary helps  --stop fluids 2 hours before bed/no  water by bed.  If have leg swelling:  Elevate feet above chest x 1 hour before bed to get excess fluid off.  Can also use support hose (knee highs).      3)  Urge incontinence, rare:  --Empty bladder every 3 hours.  Empty well: wait a minute, lean forward on toilet.    --Avoid dietary irritants (see sheet).  Keep diary x 3-5 days to determine your irritants.  --start pelvic floor PT. Call to make appt:  [facility] (all take Medicaid):  [facility] (Mary Riggs or other): (p) [phone].     [facility]  1)  [facility] [place] ([facility]  [address], [place], [address]  PT: Dr. Leda Corrales  Hours:  Monday - Thursday  7:00am-6:00pm  Friday  7:00am-5:00pm  (P) [phone]  (F) [phone]  Phone:   [phone]  Fax:   [phone]    2)  At [facility]  [address] [facility] [address] [facility] [address]  PT: Dr. Viktoriya Lobo  Hours:  Monday 7AM-6PM  Tuesday 7AM-6PM  Wednesday 7AM-6PM  Thursday 7AM-6PM  Friday 7AM-5PM  Phone:   [phone]  Fax:   [phone]    --URGE: consider medication in future. Takes 2-4 weeks to see if will have effect.  For dry mouth: get sour, sugar free lozenge or gum.    --if surgery: need to do bladder testing (urodynamics) to see if need sling for hidden stress leakage    4)  Irritable bowel, intermittent constipation/diarrhea:  --improved with cholestyramine: continue  --keep food diary; avoid triggers  --look at diet for exacerbating foods and decrease; keep diary (see handout)  --work on stress control; this can affect bowel movements: cognitive behavioral therapy  --increase hydration: drink 3-4 bottles of water/day in addition to what you drink with meals  --watch caffeine intake; make sure to replace lost water after/before drinking caffeine  --get regular exercise  --increase fiber: Start daily fiber.  Take 1 tsp of fiber powder (psyllium or other sugar-free powder).  Mix in 8 oz of water.  Take x 3-5  days.  Then, increase fiber by 1 tsp every 3-5 days until stool is easy to pass.  Stop and continue at that dose.   Do not exceed 6 tsps/day. GOAL:  More well-formed stool (one continuous well-formed piece vs. Pellets vs loose) and minimize straining with initiation.    --consider daily magnesium oxide 400 IU or mg a day  --start daily probiotic pill (lactobacillis, acidophilus)--any brand ok  --use stool softener (ducosate sodium) 1-2 times/day  --if having trouble completing evacuation:  Get small footstool (less than 6 inches) or large book and place feet on when using bathroom   --consider PT, amitiza if refractory; consider evaluation for low motility syndrome if refractory  --rectocele: stage 2    5)  Vaginal atrophy (dryness):   --consider use of vaginal estrogen if ok with oncology (h/o DCIS, E2 neg)    Non-estrogen options for vaginal dryness:  --Use REPLENS or REFRESH OTC: 1/2 applicator full in vagina twice a week.    --coconut oil: dime-sized amount with finger (as far as can reach internally) and around the vaginal opening and inner lips up to nightly as needed  --for intercourse: Uberlube, Astroglide, [place] liquibeads, Satin by Sliquid Natural Intimate Moisturizer    6)  Let us know if need anything else or would like surgery. Please follow up with Dr. Tena annually.     Approximately 30 min were spent in consult, 90% in discussion.    Thank you for requesting consultation of your patient.  I look forward to participating in their care.    Arianna Briggs  Female Pelvic Medicine and Reconstructive Surgery  [facility] [place], [place]

## 2023-07-03 NOTE — PATIENT INSTRUCTIONS
1) stage 3 cystocele, stage 2 uterine prolapse/rectocele:  --discussed pathophysiology  --continue pessary--will help you order new device    PESSARY CARE: Remove pessary as frequently as nightly and as infrequently as every 4 weeks.  Remove before intercourse.  May need to remove before bowel movements if straining dislodges.   Wash with soap and water (no ).  Replace using small amount of water-based lubricant (like KY jelly) at end being introduced into vagina.      --surgical option: laparoscopic/robotic hysterectomy, possible removal of tubes/ovaries, sacral colpopexy  --given mesh information  --if surgery: need to do bladder testing (urodynamics) to see if need sling for hidden stress leakage  --if surgery: pelvic US to define anatomy, preop visit with PCP, preop labs    2)  Nocturia (nighttime urination):  --see if pessary helps  --stop fluids 2 hours before bed/no water by bed.  If have leg swelling:  Elevate feet above chest x 1 hour before bed to get excess fluid off.  Can also use support hose (knee highs).      3)  Urge incontinence, rare:  --Empty bladder every 3 hours.  Empty well: wait a minute, lean forward on toilet.    --Avoid dietary irritants (see sheet).  Keep diary x 3-5 days to determine your irritants.  --start pelvic floor PT. Call to make appt:  [facility] (all take Medicaid):  [place] [place] (Mary Riggs or other): (p) [phone].     [facility]  1)  [facility] ([facility]'[address], [address]  PT: Dr. Leda Corrales  Hours:  Monday - Thursday  7:00am-6:00pm  Friday  7:00am-5:00pm  (P) [phone]  (F) [phone]  Phone:   [phone]  Fax:   [phone]    2)  At [facility]  [address] [facility] [address] [facility] [address]  PT: Dr. Viktoriya Lobo  Hours:  Monday 7AM-6PM  Tuesday 7AM-6PM  Wednesday 7AM-6PM  Thursday 7AM-6PM  Friday 7AM-5PM  Phone:   [phone]  Fax:   [phone]    --URGE: consider medication in future. Takes 2-4 weeks to see if will have effect.  For dry mouth: get sour, sugar free lozenge or gum.    --if surgery: need to do bladder testing (urodynamics) to see if need sling for hidden stress leakage    4)  Irritable bowel, intermittent constipation/diarrhea:  --improved with cholestyramine: continue  --keep food diary; avoid triggers  --look at diet for exacerbating foods and decrease; keep diary (see handout)  --work on stress control; this can affect bowel movements: cognitive behavioral therapy  --increase hydration: drink 3-4 bottles of water/day in addition to what you drink with meals  --watch caffeine intake; make sure to replace lost water after/before drinking caffeine  --get regular exercise  --increase fiber: Start daily fiber.  Take 1 tsp of fiber powder (psyllium or other sugar-free powder).  Mix in 8 oz of water.  Take x 3-5 days.  Then, increase fiber by 1 tsp every 3-5 days until stool is easy to pass.  Stop and continue at that dose.   Do not exceed 6 tsps/day. GOAL:  More well-formed stool (one continuous well-formed piece vs. Pellets vs loose) and minimize straining with initiation.    --consider daily magnesium oxide 400 IU or mg a day  --start daily probiotic pill (lactobacillis, acidophilus)--any brand ok  --use stool softener (ducosate sodium) 1-2 times/day  --if having trouble completing evacuation:  Get small footstool (less than 6 inches) or large book and place feet on when using bathroom   --consider PT, amitiza if refractory; consider evaluation for low motility syndrome if refractory  --rectocele: stage 2    5)  Vaginal atrophy (dryness):   --consider use of vaginal estrogen if ok with oncology (h/o DCIS, E2 neg)    Non-estrogen options for vaginal dryness:  --Use REPLENS or REFRESH OTC: 1/2 applicator full in vagina twice a week.    --coconut oil: dime-sized amount with finger (as far as can reach internally) and around the vaginal  opening and inner lips up to nightly as needed  --for intercourse: Uberlube, Astroglide, [place] liquibeads, Satin by Sliquid Natural Intimate Moisturizer    6)  Let us know if need anything else or would like surgery. Please follow up with Dr. Tena annually.

## 2025-01-07 ENCOUNTER — TELEPHONE (OUTPATIENT)
Dept: GASTROENTEROLOGY | Facility: CLINIC | Age: 70
End: 2025-01-07
Payer: MEDICARE

## 2025-01-07 NOTE — TELEPHONE ENCOUNTER
----- Message from Peggy sent at 1/7/2025  2:18 PM CST -----  Regarding: Appointment Request  Contact: patient at [phone]  Type:  Appointment Request    Name of Caller:  patient at [phone]      Symptoms:  colonoscopy    Additional Information:  Patient would like to have it done by Dr. Cabrera and only him. Please call and advise. Thank you

## 2025-01-15 ENCOUNTER — TELEPHONE (OUTPATIENT)
Dept: GASTROENTEROLOGY | Facility: CLINIC | Age: 70
End: 2025-01-15
Payer: MEDICARE

## 2025-01-15 ENCOUNTER — OFFICE VISIT (OUTPATIENT)
Dept: GASTROENTEROLOGY | Facility: CLINIC | Age: 70
End: 2025-01-15
Payer: MEDICARE

## 2025-01-15 VITALS — WEIGHT: 146.63 LBS | HEIGHT: 66 IN | BODY MASS INDEX: 23.57 KG/M2

## 2025-01-15 DIAGNOSIS — Z12.11 SCREENING FOR COLON CANCER: Primary | ICD-10-CM

## 2025-01-15 DIAGNOSIS — I48.20 CHRONIC ATRIAL FIBRILLATION: ICD-10-CM

## 2025-01-15 DIAGNOSIS — Z87.898 HISTORY OF DIARRHEA: ICD-10-CM

## 2025-01-15 DIAGNOSIS — Z79.01 CURRENT USE OF LONG TERM ANTICOAGULATION: ICD-10-CM

## 2025-01-15 DIAGNOSIS — Z86.0100 HISTORY OF COLON POLYPS: ICD-10-CM

## 2025-01-15 DIAGNOSIS — K57.90 DIVERTICULOSIS: ICD-10-CM

## 2025-01-15 PROCEDURE — 99213 OFFICE O/P EST LOW 20 MIN: CPT | Mod: PBBFAC,PO

## 2025-01-15 PROCEDURE — 99203 OFFICE O/P NEW LOW 30 MIN: CPT | Mod: S$PBB,,,

## 2025-01-15 PROCEDURE — 99999 PR PBB SHADOW E&M-EST. PATIENT-LVL III: CPT | Mod: PBBFAC,,,

## 2025-01-15 RX ORDER — AMLODIPINE BESYLATE 5 MG/1
1 TABLET ORAL DAILY
COMMUNITY
Start: 2024-08-12 | End: 2025-02-03

## 2025-01-15 RX ORDER — ESTRADIOL 0.1 MG/G
CREAM VAGINAL
COMMUNITY

## 2025-01-15 NOTE — TELEPHONE ENCOUNTER
Pt will call to schedule colon screening with Dr CHICAS once she follow up with her cardiologist about holding Eliquis and recent episode of Afib.

## 2025-01-15 NOTE — PROGRESS NOTES
"Subjective:       Patient ID: Joanna Alvarez is a 70 y.o. female Body mass index is 23.66 kg/m².    Chief Complaint: Colonoscopy    This patient is new to me.  Established patient of Dr. Hu.     Patient seen for colorectal cancer screening, high risk due to personal history of colon polyp, age appropriate, 3rd occurrence, last colonoscopy was in 05/01/2020: see surgical history, with no associated signs/symptoms (see ROS), and no alleviating/exacerbating factors. Denies family history of colon cancer/polyps.    GI Problem  Primary symptoms do not include fever, weight loss, fatigue, abdominal pain, nausea, vomiting, diarrhea (History of diarrhea well-controlled taking cholestyramine-aspartame daily; currently having 1 formed BM every other day), melena, hematemesis, jaundice, hematochezia or dysuria.   The illness does not include chills, dysphagia, odynophagia, bloating or constipation. Associated symptoms comments: Colonoscopy 05/01/2020 - "1.  Diminutive polyp in the ascending colon, cauterized and ablated without biopsy. 2.  Random biopsies of the right and left colon for diarrhea. 3.  Slightly tortuous colon. 4.  Diverticulosis, right and left colon"; patho:  Benign. TTG IgA 02/27/2014 normal.  Patient is currently attending pelvic floor therapy for cystocele. Associated medical issues do not include inflammatory bowel disease, GERD, gallstones, liver disease, alcohol abuse, PUD, gastric bypass, bowel resection, irritable bowel syndrome, hemorrhoids or diverticulitis (Diverticulosis). Associated medical issues comments: History of AFib currently on Eliquis - patient does report recent episodes last week extended episode of AFib lasting 8 hours.     Review of Systems   Constitutional:  Negative for activity change, appetite change, chills, diaphoresis, fatigue, fever, unexpected weight change and weight loss.   HENT:  Negative for sore throat and trouble swallowing.    Respiratory:  Negative for cough, " choking and shortness of breath.    Cardiovascular:  Negative for chest pain.   Gastrointestinal:  Negative for abdominal distention, abdominal pain, anal bleeding, bloating, blood in stool, constipation, diarrhea (History of diarrhea well-controlled taking cholestyramine-aspartame daily; currently having 1 formed BM every other day), dysphagia, hematemesis, hematochezia, jaundice, melena, nausea, rectal pain and vomiting.   Genitourinary:  Negative for dysuria.       No LMP recorded. Patient is postmenopausal.  Past Medical History:   Diagnosis Date    Amnesia, global, transient     Breast cancer 05/2015    right  dcis     Cancer 2015    rt breast, DCIS    Cataracts, bilateral     Colon polyp     Cystocele     Diverticulosis     Lipoma     Psoriasis      Past Surgical History:   Procedure Laterality Date    AUGMENTATION OF BREAST Bilateral 05/2015    BREAST BIOPSY Right 04/2018    BREAST BIOPSY Right 05/2015    dcis    BREAST BIOPSY Right 03/2016    benign    BREAST SURGERY Bilateral 2015    bilateral mastectomy with implants gel implants    COLONOSCOPY      5 years ago    HERNIA REPAIR Left 1995    LIPOMA RESECTION Right 2015    MASTECTOMY Bilateral 2015    with bilateral silicone gel implants and nipple sparing     Family History   Problem Relation Name Age of Onset    Heart disease Father      Cancer Brother          bladder cancer    Heart disease Brother      Breast cancer Neg Hx      Ovarian cancer Neg Hx      Vaginal cancer Neg Hx      Endometrial cancer Neg Hx      Colon cancer Neg Hx      Crohn's disease Neg Hx      Ulcerative colitis Neg Hx       Social History     Tobacco Use    Smoking status: Never    Smokeless tobacco: Never   Substance Use Topics    Alcohol use: Yes     Alcohol/week: 1.0 standard drink of alcohol     Types: 1 Standard drinks or equivalent per week     Comment:  socially    Drug use: No     Wt Readings from Last 10 Encounters:   01/15/25 66.5 kg (146 lb 9.7 oz)   02/14/24 62.1 kg (137  lb)   07/03/23 62.3 kg (137 lb 5.6 oz)   08/05/22 64.4 kg (142 lb)   04/28/21 64.7 kg (142 lb 10.2 oz)   07/27/20 64.4 kg (142 lb)   01/06/20 66 kg (145 lb 8 oz)   06/18/19 66.3 kg (146 lb 2 oz)   04/18/19 65.5 kg (144 lb 6.4 oz)   12/05/18 65.8 kg (145 lb)     Lab Results   Component Value Date    WBC 5.16 06/21/2024    HGB 13.6 06/21/2024    HCT 41.8 06/21/2024    MCV 91 06/21/2024     06/21/2024     CMP  Sodium   Date Value Ref Range Status   06/21/2024 139 136 - 145 mmol/L Final     Potassium   Date Value Ref Range Status   06/21/2024 4.5 3.5 - 5.1 mmol/L Final     Comment:     Anion Gap reference range revised on 4/28/2023     Chloride   Date Value Ref Range Status   06/21/2024 106 95 - 110 mmol/L Final     CO2   Date Value Ref Range Status   06/21/2024 23 22 - 31 mmol/L Final     Glucose   Date Value Ref Range Status   06/21/2024 84 70 - 110 mg/dL Final     Comment:     The ADA recommends the following guidelines for fasting glucose:    Normal:       less than 100 mg/dL    Prediabetes:  100 mg/dL to 125 mg/dL    Diabetes:     126 mg/dL or higher       BUN   Date Value Ref Range Status   06/21/2024 28 (H) 7 - 18 mg/dL Final     Creatinine   Date Value Ref Range Status   06/21/2024 0.76 0.50 - 1.40 mg/dL Final     Calcium   Date Value Ref Range Status   06/21/2024 9.1 8.4 - 10.2 mg/dL Final     Total Protein   Date Value Ref Range Status   06/21/2024 6.9 6.0 - 8.4 g/dL Final     Albumin   Date Value Ref Range Status   06/21/2024 4.3 3.5 - 5.2 g/dL Final     Total Bilirubin   Date Value Ref Range Status   06/21/2024 0.3 0.2 - 1.3 mg/dL Final     Alkaline Phosphatase   Date Value Ref Range Status   06/21/2024 64 38 - 145 U/L Final     AST   Date Value Ref Range Status   06/21/2024 34 14 - 36 U/L Final     ALT   Date Value Ref Range Status   06/21/2024 21 0 - 35 U/L Final     Anion Gap   Date Value Ref Range Status   06/21/2024 10 5 - 12 mmol/L Final     Comment:     Anion Gap reference range revised on  4/28/2023     eGFR if    Date Value Ref Range Status   07/20/2020 >60 >60 mL/min/1.73 m^2 Final     eGFR    Date Value Ref Range Status   09/01/2020 >105 >89 mL/min Final     eGFR if non    Date Value Ref Range Status   07/20/2020 >60 >60 mL/min/1.73 m^2 Final     Comment:     Calculation used to obtain the estimated glomerular filtration  rate (eGFR) is the CKD-EPI equation.        Reviewed prior medical records including radiology report of CT urogram abdomen and pelvis 11/13/2024, PET-CT 04/18/2022 & endoscopy history (see surgical history).    Objective:      Physical Exam  Vitals and nursing note reviewed.   Constitutional:       General: She is not in acute distress.     Appearance: Normal appearance. She is normal weight. She is not ill-appearing.   HENT:      Mouth/Throat:      Lips: Pink. No lesions.   Cardiovascular:      Pulses: Normal pulses.      Heart sounds: Normal heart sounds.   Pulmonary:      Effort: No respiratory distress.      Breath sounds: Normal breath sounds.   Abdominal:      General: Bowel sounds are normal. There is no distension or abdominal bruit. There are no signs of injury.      Palpations: Abdomen is soft. There is no shifting dullness, fluid wave, hepatomegaly, splenomegaly or mass.      Tenderness: There is no abdominal tenderness. There is no guarding or rebound. Negative signs include Rogers's sign, Rovsing's sign and McBurney's sign.   Skin:     General: Skin is warm and dry.      Coloration: Skin is not jaundiced or pale.   Neurological:      Mental Status: She is alert and oriented to person, place, and time.   Psychiatric:         Attention and Perception: Attention normal.         Mood and Affect: Mood normal.         Speech: Speech normal.         Behavior: Behavior normal.         Assessment:       1. Screening for colon cancer    2. History of colon polyps    3. History of diarrhea    4. Diverticulosis    5. Chronic atrial  fibrillation    6. Current use of long term anticoagulation        Plan:       Screening for colon cancer & History of colon polyps  - schedule Colonoscopy after cardiac clearance, discussed procedure with the patient, including risks and benefits, patient verbalized understanding    History of diarrhea  - well controlled  - avoid lactose, alcohol, & caffeine  - avoid known triggers  - continue Questran light 4 g daily as prescribed    Diverticulosis  Recommend high fiber diet (20-30 grams of fiber daily)/OTC fiber supplements daily as directed.    Chronic atrial fibrillation & Current use of long term anticoagulation  - anticoagulant(s) will likely need to be held for endoscopy, nurse will confirm with endoscopist, cardiologist, and/or PCP.    Follow up in about 3 months (around 4/15/2025), or if symptoms worsen or fail to improve.      If no improvement in symptoms or symptoms worsen, call/follow-up at clinic or go to ER.        Total time spent on the encounter includes face to face time and non-face to face time preparing to see the patient (eg, review of tests), Obtaining and/or reviewing separately obtained history, Documenting clinical information in the electronic or other health record, Independently interpreting results (not separately reported) and communicating results to the patient/family/caregiver, or Care coordination (not separately reported).     A dictation software program was used for this note. Please expect some simple typographical  errors in this note.

## 2025-01-31 DIAGNOSIS — I48.91 ATRIAL FIBRILLATION, UNSPECIFIED TYPE: Primary | ICD-10-CM

## 2025-02-02 PROBLEM — I48.0 PAROXYSMAL ATRIAL FIBRILLATION: Status: ACTIVE | Noted: 2025-02-02

## 2025-02-03 ENCOUNTER — OFFICE VISIT (OUTPATIENT)
Dept: ELECTROPHYSIOLOGY | Facility: CLINIC | Age: 70
End: 2025-02-03
Payer: MEDICARE

## 2025-02-03 ENCOUNTER — TELEPHONE (OUTPATIENT)
Dept: ELECTROPHYSIOLOGY | Facility: CLINIC | Age: 70
End: 2025-02-03

## 2025-02-03 VITALS
SYSTOLIC BLOOD PRESSURE: 112 MMHG | HEIGHT: 66 IN | BODY MASS INDEX: 23.13 KG/M2 | WEIGHT: 143.94 LBS | DIASTOLIC BLOOD PRESSURE: 78 MMHG | HEART RATE: 64 BPM

## 2025-02-03 DIAGNOSIS — I48.0 PAROXYSMAL ATRIAL FIBRILLATION: Primary | ICD-10-CM

## 2025-02-03 DIAGNOSIS — I10 PRIMARY HYPERTENSION: ICD-10-CM

## 2025-02-03 DIAGNOSIS — I48.91 ATRIAL FIBRILLATION, UNSPECIFIED TYPE: ICD-10-CM

## 2025-02-03 DIAGNOSIS — Z85.3 HISTORY OF BREAST CANCER: ICD-10-CM

## 2025-02-03 LAB
OHS QRS DURATION: 132 MS
OHS QTC CALCULATION: 476 MS

## 2025-02-03 PROCEDURE — 99999 PR PBB SHADOW E&M-EST. PATIENT-LVL II: CPT | Mod: PBBFAC,,, | Performed by: INTERNAL MEDICINE

## 2025-02-03 PROCEDURE — 99205 OFFICE O/P NEW HI 60 MIN: CPT | Mod: S$PBB,,, | Performed by: INTERNAL MEDICINE

## 2025-02-03 PROCEDURE — 99212 OFFICE O/P EST SF 10 MIN: CPT | Mod: PBBFAC | Performed by: INTERNAL MEDICINE

## 2025-02-03 PROCEDURE — 93005 ELECTROCARDIOGRAM TRACING: CPT | Mod: PBBFAC | Performed by: INTERNAL MEDICINE

## 2025-02-03 PROCEDURE — 93010 ELECTROCARDIOGRAM REPORT: CPT | Mod: S$PBB,,, | Performed by: INTERNAL MEDICINE

## 2025-02-03 RX ORDER — FLECAINIDE ACETATE 150 MG/1
150 TABLET ORAL EVERY 12 HOURS
Qty: 180 TABLET | Refills: 3 | Status: SHIPPED | OUTPATIENT
Start: 2025-02-03 | End: 2026-02-03

## 2025-02-03 NOTE — PROGRESS NOTES
Subjective   Patient ID:  Joanna Alvarez is a 70 y.o. female who presents for evaluation of Atrial Fibrillation      HPI 71 yo female with atrial fibrillation, Htn, Breast cancer (bilateral mastectomy for rt ductal carcinoma in-situ 2015), transient global amnesia, Lung cancer (RULectomy), left hip replacement.  Seen by Dr. Kim.  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).    Has had paroxysmal symptomatic atrial fibrillation, initially diagnosed 12/17/20.  Placed on metoprolol >> noted to be bradycardic. Ultimately, this was discontinued and Flecainide added (2021).  Has continued to have paroxysmal AF, increasing in nature. Utilizes PRN metoprolol. Longest episode over the past year has been 8 hours.  Echo 8/5/22 normal biventricular structure and function    Rt upper lobectomy 6/22 for Adenocarcinoma    Echo 2/14/24     Left Ventricle: The left ventricle is normal in size. Normal wall thickness. Normal wall motion. There is normal systolic function with a visually estimated ejection fraction of 55 - 60%. There is normal diastolic function.    Right Ventricle: Normal right ventricular cavity size. Wall thickness is normal. Right ventricle wall motion  is normal. Systolic function is normal.    Pulmonary Artery: No pulmonary hypertension. The estimated pulmonary artery systolic pressure is 26 mmHg.    IVC/SVC: Normal venous pressure at 3 mmHg.    I have independently review today's ECG and it revealed nsr with incomplete RBBB.    1 cup of coffee daily.  Rare alcohol.  Walks 2 miles many times a week.  Has been diagnosed with mild sleep apnea, and trial of CPAP was recommended. Not on regularly. Sleep test re-ordered.    On Eliquis 5 mg BID.    Review of Systems   Constitutional: Negative. Negative for fever and malaise/fatigue.   HENT:  Negative for congestion and sore throat.    Cardiovascular:  Positive for palpitations. Negative for chest pain, dyspnea on exertion,  irregular heartbeat, leg swelling, near-syncope, orthopnea, paroxysmal nocturnal dyspnea and syncope.   Respiratory:  Negative for cough and shortness of breath.    Gastrointestinal:  Negative for abdominal pain, constipation and diarrhea.   Neurological:  Negative for dizziness, light-headedness and weakness.   Psychiatric/Behavioral:  Negative for depression. The patient is not nervous/anxious.           Objective     Physical Exam  Constitutional:       Appearance: She is well-developed.   Eyes:      General: No scleral icterus.     Conjunctiva/sclera: Conjunctivae normal.   Neck:      Vascular: No JVD.      Trachea: No tracheal deviation.   Cardiovascular:      Rate and Rhythm: Normal rate and regular rhythm.      Chest Wall: PMI is not displaced.   Pulmonary:      Effort: Pulmonary effort is normal. No respiratory distress.      Breath sounds: Normal breath sounds.   Abdominal:      Palpations: Abdomen is soft.      Tenderness: There is no abdominal tenderness.   Musculoskeletal:         General: No tenderness.   Skin:     General: Skin is warm and dry.      Findings: No rash.   Neurological:      Mental Status: She is alert and oriented to person, place, and time.   Psychiatric:         Behavior: Behavior normal.            Assessment and Plan     1. Paroxysmal atrial fibrillation    2. History of breast cancer    3. Primary hypertension        Plan:     Paroxysmal symptomatic atrial fibrillation, refractory to Flecainide.  Recommend PVI. Risks and benefits of PVI and posterior wall isolation with PFA.  She would like to proceed.  SISI day of procedure, cancel if in nsr.  Hold Eliquis morning of procedure.  Hold Flecainide 3 days prior.    In interim, increase Flecainide to 150 mg BID.

## 2025-02-12 ENCOUNTER — HOSPITAL ENCOUNTER (OUTPATIENT)
Dept: CARDIOLOGY | Facility: HOSPITAL | Age: 70
Discharge: HOME | End: 2025-02-12
Attending: INTERNAL MEDICINE
Payer: MEDICARE

## 2025-02-12 VITALS — HEIGHT: 66 IN | WEIGHT: 146 LBS | BODY MASS INDEX: 23.46 KG/M2

## 2025-02-12 PROCEDURE — 93306 TTE W/DOPPLER COMPLETE: CPT | Mod: PO

## 2025-02-12 PROCEDURE — 93306 TTE W/DOPPLER COMPLETE: CPT | Mod: 26,,, | Performed by: INTERNAL MEDICINE

## 2025-02-13 LAB
ASCENDING AORTA: 3.1 CM
AV INDEX (PROSTH): 0.82
AV MEAN GRADIENT: 3 MMHG
AV PEAK GRADIENT: 6 MMHG
AV VALVE AREA BY VELOCITY RATIO: 2.9 CM²
AV VALVE AREA: 2.8 CM²
AV VELOCITY RATIO: 0.83
BSA FOR ECHO PROCEDURE: 1.76 M2
CV ECHO LV RWT: 0.38 CM
DOP CALC AO PEAK VEL: 1.2 M/S
DOP CALC AO VTI: 32 CM
DOP CALC LVOT AREA: 3.5 CM2
DOP CALC LVOT DIAMETER: 2.1 CM
DOP CALC LVOT PEAK VEL: 1 M/S
DOP CALC LVOT STROKE VOLUME: 90.7 CM3
DOP CALCLVOT PEAK VEL VTI: 26.2 CM
E WAVE DECELERATION TIME: 203 MSEC
E/A RATIO: 1.16
E/E' RATIO: 8 M/S
ECHO LV POSTERIOR WALL: 0.9 CM (ref 0.6–1.1)
FRACTIONAL SHORTENING: 31.9 % (ref 28–44)
INTERVENTRICULAR SEPTUM: 1 CM (ref 0.6–1.1)
LEFT ATRIUM AREA SYSTOLIC (APICAL 2 CHAMBER): 22.02 CM2
LEFT ATRIUM AREA SYSTOLIC (APICAL 4 CHAMBER): 20.92 CM2
LEFT ATRIUM SIZE: 4 CM
LEFT ATRIUM VOLUME INDEX MOD: 42 ML/M2
LEFT ATRIUM VOLUME MOD: 74 ML
LEFT INTERNAL DIMENSION IN SYSTOLE: 3.2 CM (ref 2.1–4)
LEFT VENTRICLE DIASTOLIC VOLUME INDEX: 57.72 ML/M2
LEFT VENTRICLE DIASTOLIC VOLUME: 101.01 ML
LEFT VENTRICLE END SYSTOLIC VOLUME APICAL 2 CHAMBER: 70.39 ML
LEFT VENTRICLE END SYSTOLIC VOLUME APICAL 4 CHAMBER: 70.81 ML
LEFT VENTRICLE MASS INDEX: 87.7 G/M2
LEFT VENTRICLE SYSTOLIC VOLUME INDEX: 23.6 ML/M2
LEFT VENTRICLE SYSTOLIC VOLUME: 41.32 ML
LEFT VENTRICULAR INTERNAL DIMENSION IN DIASTOLE: 4.7 CM (ref 3.5–6)
LEFT VENTRICULAR MASS: 153.4 G
LV LATERAL E/E' RATIO: 5.8 M/S
LV SEPTAL E/E' RATIO: 11.6 M/S
LVED V (TEICH): 101.01 ML
LVES V (TEICH): 41.32 ML
LVOT MG: 1.97 MMHG
LVOT MV: 0.63 CM/S
MV PEAK A VEL: 0.7 M/S
MV PEAK E VEL: 0.81 M/S
OHS CV RV/LV RATIO: 0.89 CM
PISA TR MAX VEL: 2.5 M/S
PULM VEIN S/D RATIO: 0.9
PV PEAK D VEL: 0.72 M/S
PV PEAK S VEL: 0.65 M/S
RA PRESSURE ESTIMATED: 3 MMHG
RIGHT VENTRICLE DIASTOLIC BASEL DIMENSION: 4.2 CM
RIGHT VENTRICLE DIASTOLIC LENGTH: 7.2 CM
RIGHT VENTRICLE DIASTOLIC MID DIMENSION: 2.7 CM
RIGHT VENTRICULAR END-DIASTOLIC DIMENSION: 4.24 CM
RIGHT VENTRICULAR LENGTH IN DIASTOLE (APICAL 4-CHAMBER VIEW): 7.18 CM
RV MID DIAMA: 2.73 CM
RV TB RVSP: 6 MMHG
RV TISSUE DOPPLER FREE WALL SYSTOLIC VELOCITY 1 (APICAL 4 CHAMBER VIEW): 12.48 CM/S
SINUS: 2.82 CM
STJ: 3 CM
TDI LATERAL: 0.14 M/S
TDI SEPTAL: 0.07 M/S
TDI: 0.11 M/S
TR MAX PG: 25 MMHG
TRICUSPID ANNULAR PLANE SYSTOLIC EXCURSION: 3.19 CM
TV REST PULMONARY ARTERY PRESSURE: 28 MMHG
Z-SCORE OF LEFT VENTRICULAR DIMENSION IN END DIASTOLE: -0.3
Z-SCORE OF LEFT VENTRICULAR DIMENSION IN END SYSTOLE: 0.52

## 2025-02-14 ENCOUNTER — TELEPHONE (OUTPATIENT)
Dept: ELECTROPHYSIOLOGY | Facility: CLINIC | Age: 70
End: 2025-02-14
Payer: MEDICARE

## 2025-02-14 NOTE — TELEPHONE ENCOUNTER
Spoke with patient and relayed results/recommendations, per Dr Plummer's note:    Tam Plummer MD Wilson, Elizabeth B., RN  Echo reveals normal structure and function with mild MR.  Please relay to patient    She verbalized understanding and was very appreciative of the call.

## 2025-02-14 NOTE — TELEPHONE ENCOUNTER
----- Message from Tam Plummer MD sent at 2/14/2025  7:28 AM CST -----  Echo reveals normal structure and function with mild MR.  Please relay to patient  ----- Message -----  From: Louis Love MD  Sent: 2/13/2025   7:51 AM CST  To: Tam Plummer MD

## 2025-02-26 DIAGNOSIS — I48.0 PAROXYSMAL ATRIAL FIBRILLATION: Primary | ICD-10-CM

## 2025-03-12 ENCOUNTER — PATIENT MESSAGE (OUTPATIENT)
Dept: ELECTROPHYSIOLOGY | Facility: CLINIC | Age: 70
End: 2025-03-12
Payer: MEDICARE

## 2025-04-03 ENCOUNTER — PATIENT MESSAGE (OUTPATIENT)
Dept: ELECTROPHYSIOLOGY | Facility: CLINIC | Age: 70
End: 2025-04-03
Payer: MEDICARE

## 2025-04-14 ENCOUNTER — ANESTHESIA EVENT (OUTPATIENT)
Dept: MEDSURG UNIT | Facility: HOSPITAL | Age: 70
End: 2025-04-14
Payer: MEDICARE

## 2025-04-14 NOTE — ANESTHESIA PREPROCEDURE EVALUATION
04/14/2025  Joanna Alvarez is a 70 y.o., female.      Pre-op Assessment    I have reviewed the Patient Summary Reports.       I have reviewed the Medications.     Review of Systems  Anesthesia Hx:  No problems with previous Anesthesia               Denies Personal Hx of Anesthesia complications.                    Cardiovascular:     Hypertension                                    Hypertension     Atrial Fibrillation         Physical Exam    Airway:  No airway management difficulties anticipated  Dental:  No active dental issues noted  Chest/Lungs:  Clear to auscultation    Heart:  Rate: Normal  Rhythm: Regular Rhythm  Sounds: Normal        Anesthesia Plan  Type of Anesthesia, risks & benefits discussed:    Anesthesia Type: Gen ETT  Intra-op Monitoring Plan: Art Line  Informed Consent: Informed consent signed with the Patient and all parties understand the risks and agree with anesthesia plan.  All questions answered.   ASA Score: 3  Anesthesia Plan Notes: Chart reviewed. Patient seen and examined. Anesthesia plan discussed and questions answered. E-consent signed. Mike Quinteros MD    Ready For Surgery From Anesthesia Perspective.     .

## 2025-04-14 NOTE — TELEPHONE ENCOUNTER
Spoke to patient and her .    CONFIRMED procedure arrival time of 5:15am tomorrow for PVI PFA with Dr Plummer    Reiterated instructions including:  -Directions to check in desk  -NPO after midnight night prior to procedure  -High importance of HOLDING Flecainide for 3 days and both confirmed that her last dose was 4/11/2025  -Confirmed compliance of Eliquis and discussed Dr Plummer's recommendation to only miss the am dose of day of procedure. She will take her evening dose today.  -Pre-procedure LABS reviewed; no alerts noted  -Confirmed absence of implanted device/stimulator   -Confirmed no fever, cough, or shortness of breath in the past 30 days  -Confirmed no redness, rash, irritation, or yeast infection to groin area.   -Do not wear mascara day of procedure  -Bathe night prior and morning prior to procedure with Hibiclens solution or an antibacterial soap  -Reviewed current visitor policy    Patient verbalized understanding of above and appreciated the call.

## 2025-04-15 ENCOUNTER — HOSPITAL ENCOUNTER (OUTPATIENT)
Facility: HOSPITAL | Age: 70
Discharge: HOME | End: 2025-04-15
Attending: INTERNAL MEDICINE | Admitting: INTERNAL MEDICINE
Payer: MEDICARE

## 2025-04-15 ENCOUNTER — ANESTHESIA (OUTPATIENT)
Dept: MEDSURG UNIT | Facility: HOSPITAL | Age: 70
End: 2025-04-15
Payer: MEDICARE

## 2025-04-15 VITALS
RESPIRATION RATE: 16 BRPM | DIASTOLIC BLOOD PRESSURE: 60 MMHG | OXYGEN SATURATION: 99 % | WEIGHT: 140 LBS | HEIGHT: 66 IN | HEART RATE: 73 BPM | BODY MASS INDEX: 22.5 KG/M2 | SYSTOLIC BLOOD PRESSURE: 136 MMHG | TEMPERATURE: 97 F

## 2025-04-15 DIAGNOSIS — I48.0 PAROXYSMAL ATRIAL FIBRILLATION: ICD-10-CM

## 2025-04-15 DIAGNOSIS — I49.9 ARRHYTHMIA: ICD-10-CM

## 2025-04-15 DIAGNOSIS — I48.91 ATRIAL FIBRILLATION: ICD-10-CM

## 2025-04-15 LAB
OHS QRS DURATION: 96 MS
OHS QRS DURATION: 96 MS
OHS QTC CALCULATION: 414 MS
OHS QTC CALCULATION: 455 MS

## 2025-04-15 PROCEDURE — 93010 ELECTROCARDIOGRAM REPORT: CPT | Mod: ,,, | Performed by: INTERNAL MEDICINE

## 2025-04-15 PROCEDURE — C1894 INTRO/SHEATH, NON-LASER: HCPCS | Performed by: INTERNAL MEDICINE

## 2025-04-15 PROCEDURE — 25000003 PHARM REV CODE 250: Performed by: NURSE ANESTHETIST, CERTIFIED REGISTERED

## 2025-04-15 PROCEDURE — 37000008 HC ANESTHESIA 1ST 15 MINUTES: Performed by: INTERNAL MEDICINE

## 2025-04-15 PROCEDURE — C1769 GUIDE WIRE: HCPCS | Performed by: INTERNAL MEDICINE

## 2025-04-15 PROCEDURE — C1753 CATH, INTRAVAS ULTRASOUND: HCPCS | Performed by: INTERNAL MEDICINE

## 2025-04-15 PROCEDURE — 63600175 PHARM REV CODE 636 W HCPCS: Performed by: NURSE ANESTHETIST, CERTIFIED REGISTERED

## 2025-04-15 PROCEDURE — 37000009 HC ANESTHESIA EA ADD 15 MINS: Performed by: INTERNAL MEDICINE

## 2025-04-15 PROCEDURE — 63600175 PHARM REV CODE 636 W HCPCS: Performed by: INTERNAL MEDICINE

## 2025-04-15 PROCEDURE — C1887 CATHETER, GUIDING: HCPCS | Performed by: INTERNAL MEDICINE

## 2025-04-15 PROCEDURE — 27201423 OPTIME MED/SURG SUP & DEVICES STERILE SUPPLY: Performed by: INTERNAL MEDICINE

## 2025-04-15 PROCEDURE — 93005 ELECTROCARDIOGRAM TRACING: CPT

## 2025-04-15 PROCEDURE — C1733 CATH, EP, OTHR THAN COOL-TIP: HCPCS | Performed by: INTERNAL MEDICINE

## 2025-04-15 PROCEDURE — 27201037 HC PRESSURE MONITORING SET UP

## 2025-04-15 PROCEDURE — C1766 INTRO/SHEATH,STRBLE,NON-PEEL: HCPCS | Performed by: INTERNAL MEDICINE

## 2025-04-15 PROCEDURE — 93656 COMPRE EP EVAL ABLTJ ATR FIB: CPT | Mod: ,,, | Performed by: INTERNAL MEDICINE

## 2025-04-15 PROCEDURE — 93656 COMPRE EP EVAL ABLTJ ATR FIB: CPT | Performed by: INTERNAL MEDICINE

## 2025-04-15 PROCEDURE — C1730 CATH, EP, 19 OR FEW ELECT: HCPCS | Performed by: INTERNAL MEDICINE

## 2025-04-15 PROCEDURE — 63600175 PHARM REV CODE 636 W HCPCS

## 2025-04-15 RX ORDER — PHENYLEPHRINE HYDROCHLORIDE 10 MG/ML
INJECTION INTRAVENOUS
Status: DISCONTINUED | OUTPATIENT
Start: 2025-04-15 | End: 2025-04-15

## 2025-04-15 RX ORDER — FENTANYL CITRATE 50 UG/ML
INJECTION, SOLUTION INTRAMUSCULAR; INTRAVENOUS
Status: DISCONTINUED | OUTPATIENT
Start: 2025-04-15 | End: 2025-04-15

## 2025-04-15 RX ORDER — ACETAMINOPHEN 325 MG/1
650 TABLET ORAL EVERY 4 HOURS PRN
OUTPATIENT
Start: 2025-04-15

## 2025-04-15 RX ORDER — HEPARIN SODIUM 1000 [USP'U]/ML
INJECTION, SOLUTION INTRAVENOUS; SUBCUTANEOUS
Status: DISCONTINUED | OUTPATIENT
Start: 2025-04-15 | End: 2025-04-15

## 2025-04-15 RX ORDER — PROPOFOL 10 MG/ML
VIAL (ML) INTRAVENOUS
Status: DISCONTINUED | OUTPATIENT
Start: 2025-04-15 | End: 2025-04-15

## 2025-04-15 RX ORDER — LIDOCAINE HYDROCHLORIDE 20 MG/ML
INJECTION INTRAVENOUS
Status: DISCONTINUED | OUTPATIENT
Start: 2025-04-15 | End: 2025-04-15

## 2025-04-15 RX ORDER — ONDANSETRON HYDROCHLORIDE 2 MG/ML
4 INJECTION, SOLUTION INTRAVENOUS ONCE AS NEEDED
Status: DISCONTINUED | OUTPATIENT
Start: 2025-04-15 | End: 2025-04-15 | Stop reason: HOSPADM

## 2025-04-15 RX ORDER — MIDAZOLAM HYDROCHLORIDE 1 MG/ML
INJECTION INTRAMUSCULAR; INTRAVENOUS
Status: DISCONTINUED | OUTPATIENT
Start: 2025-04-15 | End: 2025-04-15

## 2025-04-15 RX ORDER — FLECAINIDE ACETATE 100 MG/1
100 TABLET ORAL EVERY 12 HOURS
Qty: 180 TABLET | Refills: 3 | Status: SHIPPED | OUTPATIENT
Start: 2025-04-15 | End: 2026-04-15

## 2025-04-15 RX ORDER — ONDANSETRON HYDROCHLORIDE 2 MG/ML
INJECTION, SOLUTION INTRAVENOUS
Status: DISCONTINUED | OUTPATIENT
Start: 2025-04-15 | End: 2025-04-15

## 2025-04-15 RX ORDER — PROCHLORPERAZINE EDISYLATE 5 MG/ML
5 INJECTION INTRAMUSCULAR; INTRAVENOUS EVERY 30 MIN PRN
Status: DISCONTINUED | OUTPATIENT
Start: 2025-04-15 | End: 2025-04-15 | Stop reason: HOSPADM

## 2025-04-15 RX ORDER — ATROPINE SULFATE 0.1 MG/ML
INJECTION INTRAVENOUS
Status: DISCONTINUED | OUTPATIENT
Start: 2025-04-15 | End: 2025-04-15

## 2025-04-15 RX ORDER — LIDOCAINE HYDROCHLORIDE 20 MG/ML
INJECTION, SOLUTION INFILTRATION; PERINEURAL
Status: DISCONTINUED | OUTPATIENT
Start: 2025-04-15 | End: 2025-04-15 | Stop reason: HOSPADM

## 2025-04-15 RX ORDER — PROTAMINE SULFATE 10 MG/ML
INJECTION, SOLUTION INTRAVENOUS
Status: DISCONTINUED | OUTPATIENT
Start: 2025-04-15 | End: 2025-04-15

## 2025-04-15 RX ORDER — FENTANYL CITRATE 50 UG/ML
25 INJECTION, SOLUTION INTRAMUSCULAR; INTRAVENOUS EVERY 5 MIN PRN
Status: DISCONTINUED | OUTPATIENT
Start: 2025-04-15 | End: 2025-04-15 | Stop reason: HOSPADM

## 2025-04-15 RX ORDER — HEPARIN SODIUM 10000 [USP'U]/100ML
INJECTION, SOLUTION INTRAVENOUS CONTINUOUS PRN
Status: DISCONTINUED | OUTPATIENT
Start: 2025-04-15 | End: 2025-04-15

## 2025-04-15 RX ORDER — HEPARIN SOD,PORCINE/0.9 % NACL 1000/500ML
INTRAVENOUS SOLUTION INTRAVENOUS
Status: DISCONTINUED | OUTPATIENT
Start: 2025-04-15 | End: 2025-04-15 | Stop reason: HOSPADM

## 2025-04-15 RX ORDER — ROCURONIUM BROMIDE 10 MG/ML
INJECTION, SOLUTION INTRAVENOUS
Status: DISCONTINUED | OUTPATIENT
Start: 2025-04-15 | End: 2025-04-15

## 2025-04-15 RX ORDER — DIPHENHYDRAMINE HYDROCHLORIDE 50 MG/ML
25 INJECTION, SOLUTION INTRAMUSCULAR; INTRAVENOUS EVERY 6 HOURS PRN
Status: DISCONTINUED | OUTPATIENT
Start: 2025-04-15 | End: 2025-04-15 | Stop reason: HOSPADM

## 2025-04-15 RX ORDER — HYDROMORPHONE HYDROCHLORIDE 1 MG/ML
0.2 INJECTION, SOLUTION INTRAMUSCULAR; INTRAVENOUS; SUBCUTANEOUS EVERY 5 MIN PRN
Status: DISCONTINUED | OUTPATIENT
Start: 2025-04-15 | End: 2025-04-15 | Stop reason: HOSPADM

## 2025-04-15 RX ADMIN — PHENYLEPHRINE HYDROCHLORIDE 100 MCG: 10 INJECTION INTRAVENOUS at 07:04

## 2025-04-15 RX ADMIN — SUGAMMADEX 200 MG: 100 INJECTION, SOLUTION INTRAVENOUS at 09:04

## 2025-04-15 RX ADMIN — MIDAZOLAM HYDROCHLORIDE 2 MG: 2 INJECTION, SOLUTION INTRAMUSCULAR; INTRAVENOUS at 07:04

## 2025-04-15 RX ADMIN — HEPARIN SODIUM 2000 UNITS: 1000 INJECTION, SOLUTION INTRAVENOUS; SUBCUTANEOUS at 08:04

## 2025-04-15 RX ADMIN — FENTANYL CITRATE 50 MCG: 50 INJECTION, SOLUTION INTRAMUSCULAR; INTRAVENOUS at 07:04

## 2025-04-15 RX ADMIN — ONDANSETRON 4 MG: 2 INJECTION INTRAMUSCULAR; INTRAVENOUS at 09:04

## 2025-04-15 RX ADMIN — PROTAMINE SULFATE 65 MG: 10 INJECTION, SOLUTION INTRAVENOUS at 09:04

## 2025-04-15 RX ADMIN — PROPOFOL 150 MG: 10 INJECTION, EMULSION INTRAVENOUS at 07:04

## 2025-04-15 RX ADMIN — HEPARIN SODIUM AND DEXTROSE 12 UNITS/KG/HR: 10000; 5 INJECTION INTRAVENOUS at 08:04

## 2025-04-15 RX ADMIN — ROCURONIUM BROMIDE 20 MG: 10 INJECTION INTRAVENOUS at 08:04

## 2025-04-15 RX ADMIN — LIDOCAINE HYDROCHLORIDE 80 MG: 20 INJECTION INTRAVENOUS at 07:04

## 2025-04-15 RX ADMIN — PHENYLEPHRINE HYDROCHLORIDE 0.2 MCG/KG/MIN: 10 INJECTION INTRAVENOUS at 07:04

## 2025-04-15 RX ADMIN — SODIUM CHLORIDE: 0.9 INJECTION, SOLUTION INTRAVENOUS at 08:04

## 2025-04-15 RX ADMIN — SODIUM CHLORIDE: 0.9 INJECTION, SOLUTION INTRAVENOUS at 07:04

## 2025-04-15 RX ADMIN — HEPARIN SODIUM 13000 UNITS: 1000 INJECTION, SOLUTION INTRAVENOUS; SUBCUTANEOUS at 08:04

## 2025-04-15 RX ADMIN — ATROPINE SULFATE 0.5 MG: 0.1 INJECTION INTRAVENOUS at 08:04

## 2025-04-15 RX ADMIN — ROCURONIUM BROMIDE 50 MG: 10 INJECTION INTRAVENOUS at 07:04

## 2025-04-15 RX ADMIN — PHENYLEPHRINE HYDROCHLORIDE 50 MCG: 10 INJECTION INTRAVENOUS at 07:04

## 2025-04-15 RX ADMIN — ONDANSETRON 4 MG: 2 INJECTION INTRAMUSCULAR; INTRAVENOUS at 11:04

## 2025-04-15 NOTE — PLAN OF CARE
Received report from EARLENE Sutton. Patient s/p ablation, AAOx3. VSS, no c/o pain or discomfort at this time, resp even and unlabored. Suture/stopcock dressing to brook groin are CDI. No active bleeding. No hematoma noted. Post procedure protocol reviewed with patient and patient's family. Understanding verbalized. Family members at bedside. Nurse call bell within reach. Will continue to monitor per post procedure protocol.

## 2025-04-15 NOTE — DISCHARGE INSTRUCTIONS
"Ablation Discharge Instructions and Care of Your Groin      Follow up:  Follow up with Dr. Plummer in 3 months.    Medications:  Make sure to continue taking your blood thinner apixiban (trade name: Eliquis) after your procedure as prescribed  You may experience chest discomfort (also known as "pericarditis") with deep breathes, coughing, and/or laying down which is typically normal following your procedure. If this occurs, you can take ibuprofen (Motrin) 800 mg every 8 hours for 2-3 days. If the chest pain is persistent or severe please visit the nearest emergency department.    Groin site management, precautions, and restrictions:  Remove the bandages over your groin area the morning after your procedure. You can shower after you remove these bandages. Wash the sites at least once daily with soap and water. Keep the groin sites clean and dry. You do not need to apply ointments or bandages to the area.   Wear loose clothes and loose underwear.  Do not take a bath or submerge your groin area (for example: Jacuzzi, swimming pool, or lake) or at least 1 week or when the puncture sites in your groin have completely healed.     If bleeding should occur at the groin sites following discharge:  If oozing from groin site occurs, lay down and apply pressure to the puncture site with your fingers without letting up for 15 minutes and lay flat for 1 hour. If bleeding has resolved, you can continue to monitor. If the bleeding continues or there is significant swelling or pain in the groin area, please call 911 immediately and visit the nearest ER for evaluation and treatment. Do not drive yourself to the hospital. DO NOT STOP TAKING YOUR BLOOD THINNER UNLESS INSTRUCTED BY A PHYSICIAN.     Activity Instructions:  Do not drive or operate any dangerous machinery for 24 hours, but optimally 48-72 hours since you were given general anesthesia.   Do not strain during bowel movements for the first 3 to 4 days after the procedure to " prevent bleeding from the groin sites.  Avoid heavy lifting (more than 10 pounds) and pushing or pulling heavy objects for the first 5 to 7 days after the procedure.  Do not participate in strenuous activities for 5 days after the procedure. This includes most sports - jogging, golfing, play tennis, and bowling.  You may climb stairs if needed, but walk up and down the stairs more slowly than usual.  Gradually increase your activities until you reach your normal activity level within one week after the procedure.    Go to the Emergency Department if you develop:   Change in color or temperature of the leg  Redness, warmth, or drainage/pus at the groin sites  Chills or fever greater than 101.0 F   Severe bleeding or swelling at the groin sites  Acute Weakness or numbness   Visual, gait or speech disturbances   New chest pain, palpitations, shortness of breath, fainting

## 2025-04-15 NOTE — PLAN OF CARE
Patient arrived to room. PIV x 2 placed. Admit assessment completed. Plan of care discussed with patient.  at bedside. Nurse call bell within reach. Will monitor

## 2025-04-15 NOTE — PLAN OF CARE
Pt co of feeling beat and states that things in the room are moving. MD Quinteros notified. Okayed dose of Zofran.

## 2025-04-15 NOTE — DISCHARGE SUMMARY
[facility] - Short Stay Cardiac Unit  Cardiology  Discharge Summary      Patient Name: Joanna Alvarez  MRN: [id number]  Admission Date: 4/15/2025  Hospital Length of Stay: 0 days  Discharge Date and Time: 4/15/2025  3:04 PM  Attending Physician: Tam Plummer MD    Discharging Provider: NAIN Bradford  Primary Care Physician: Jj Gerardo MD    HPI:   Joanna Alvarez is a 70 y.o. female with atrial fibrillation, Htn, Breast cancer (bilateral mastectomy for rt ductal carcinoma in-situ 2015), transient global amnesia, Lung cancer (RULectomy), left hip replacement.     History obtained from previous visits as well as through patient report.    Background:     From last office visit with Dr. Plummer on 2/3/2025.     69 yo female with atrial fibrillation, Htn, Breast cancer (bilateral mastectomy for rt ductal carcinoma in-situ 2015), transient global amnesia, Lung cancer (RULectomy), left hip replacement.  Seen by Dr. Kim.  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     Has had paroxysmal symptomatic atrial fibrillation, initially diagnosed 12/17/20.  Placed on metoprolol >> noted to be bradycardic. Ultimately, this was discontinued and Flecainide added (2021).  Has continued to have paroxysmal AF, increasing in nature. Utilizes PRN metoprolol. Longest episode over the past year has been 8 hours.  Echo 8/5/22 normal biventricular structure and function     Rt upper lobectomy 6/22 for Adenocarcinoma    2/3/2025: ECG revealed nsr with incomplete RBBB.   1 cup of coffee daily.  Rare alcohol.  Walks 2 miles many times a week.  Has been diagnosed with mild sleep apnea, and trial of CPAP was recommended. Not on regularly. Sleep test re-ordered.   On Eliquis 5 mg BID.  Paroxysmal symptomatic atrial fibrillation, refractory to Flecainide.  Recommend PVI. Risks and benefits of PVI and posterior wall isolation with PFA.  She would like to proceed.  SISI day of procedure, cancel if in  nsr.  Hold Eliquis morning of procedure.  Hold Flecainide 3 days prior.     In interim, increase Flecainide to 150 mg BID.    Interval Hx:     Joanna Alvarez presents today to [facility] for scheduled PFA-PVI with Dr. Plummer. She denies any chest pain, palpitations, SOB, LARA, dizziness, light headedness, weakness, syncope, or near syncopal episodes. She denies any bleeding, infections, fevers, rashes, or surgeries in the past 30 days. She is currently taking Eliquis. Last dose of Eliquis was on 4/14/2025 PM.    ECG today shows SB at 51 bpm  ms QRS 96 ms QT/Qtc 450/414 ms.    Pertinent labs reviewed from 4/8/2025. INR 1.0, Hgb 14.6, Cr 0.68    Echo  Result Date: 2/13/2025    Left Ventricle: The left ventricle is normal in size. Normal wall   thickness. There is normal systolic function with a visually estimated   ejection fraction of 55 - 60%. There is normal diastolic function.    Right Ventricle: Normal right ventricular cavity size. Systolic   function is normal.    Left Atrium: Left atrium is moderately dilated. The left atrium volume   index MOD is 42 mL/m2.    Aortic Valve: The aortic valve is a trileaflet valve. There is mild   aortic regurgitation. No significant stenosis.    Mitral Valve: There is mild regurgitation with a posterolateral   eccentrically directed jet.    Tricuspid Valve: There is mild regurgitation.    Pulmonary Artery: The estimated pulmonary artery systolic pressure is   28 mmHg.    IVC/SVC: Normal venous pressure at 3 mmHg.    Pericardium: There is no pericardial effusion.      Procedure(s) (LRB):  Ablation atrial fibrillation (N/A)     Indwelling Lines/Drains at time of discharge:  None       Hospital Course:  Patient underwent PFA-PVI (see procedure note for details), tolerated procedure well with no acute complications. Admitted to PACU, post-procedure ECG showed sinus rhythm with IRBBB at 64 bpm  ms QRS 96 ms QT/QTc 422/455 ms. Bilateral groin sites with sutures removed,  dressings C/D/I. No bleeding, hematoma, or bruit noted. Bedrest completed x 4 hours. She was monitored on telemetry, no acute arrhythmias.     Patient ambulating, tolerating PO intake, and voiding with no issues. Denies any chest pain or SOB. Discharge plans discussed with Dr. Plummer. Patient to continue all home medications including AAD flecainide 100 mg BID and Eliquis for CVA prophylaxis. Follow up with Dr. Plummer in 3 months. She was assessed at bedside prior to discharge. She reported feeling well and denied chest discomfort, shortness of breath, palpitations, lightheadedness, or any other acute symptoms. Discharge plans/instructions discussed with patient and  who verbalized understanding and agreement of plans of care. No further questions or concerns voiced at this time. She was discharged home in stable condition.     Physical Exam:   Gen: No acute distress, pleasant patient answering questions appropriately  CVS: Regular rate and rhythm  CHEST: Breathing even and unlabored.   ABD: Soft, non-tender, nondistended  GROINS:Bilateral groin sites soft, non tender, no bleeding, no swelling, no hematoma. Dressings to bilateral groin C/D/I  Neurologic: Normal strength and tone. No focal numbness or weakness.   Psychiatric: Normal mood and affect. Behavior is normal. Alert and oriented X 3.  EXT: No Edema      Significant Diagnostic Studies:   Electrophysiology Procedure  Result Date: 4/15/2025    Successful pulmonary vein RF isolation with PFA.   3D mapping performed with Ensite.   Intracardiac echo.   Posterior wall isolation. I certify that I was present for the critical steps of the procedure including the diagnostic, surgical and/or interventional portions. Procedure Log documented by No documenter listed and verified by Tam Plummer MD. Date: 4/15/2025  Time: 9:12 AM      Discharged Condition: stable    Disposition: Home or Self Care    Follow Up:   Follow-up Information       Tam Plummer MD Follow  "up in 3 month(s).    Specialties: Electrophysiology, Cardiology  Why: routine follow up  Contact information:  [address]  [facility] [address]  [phone]                           Patient Instructions:      No driving until:   Order Comments: No driving at least for the next 48 hours     Notify your health care provider if you experience any of the following:  temperature >100.4     Notify your health care provider if you experience any of the following:  persistent nausea and vomiting or diarrhea     Notify your health care provider if you experience any of the following:  severe uncontrolled pain     Notify your health care provider if you experience any of the following:  redness, tenderness, or signs of infection (pain, swelling, redness, odor or green/yellow discharge around incision site)     Notify your health care provider if you experience any of the following:  difficulty breathing or increased cough     Notify your health care provider if you experience any of the following:  severe persistent headache     Notify your health care provider if you experience any of the following:  worsening rash     Notify your health care provider if you experience any of the following:  persistent dizziness, light-headedness, or visual disturbances     Notify your health care provider if you experience any of the following:  increased confusion or weakness     Remove dressing in 24 hours     Other restrictions (specify):   Order Comments: Ablation Discharge Instructions and Care of Your Groin      Follow up:  · Follow up with Dr. Plummer in 3 months.    Medications:  · Make sure to continue taking your blood thinner apixiban (trade name: Eliquis) after your procedure as prescribed  · You may experience chest discomfort (also known as "pericarditis") with deep breathes, coughing, and/or laying down which is typically normal following your procedure. If this occurs, you can take ibuprofen (Motrin) 800 mg every 8 " hours for 2-3 days. If the chest pain is persistent or severe please visit the nearest emergency department.    Groin site management, precautions, and restrictions:  · Remove the bandages over your groin area the morning after your procedure. You can shower after you remove these bandages. Wash the sites at least once daily with soap and water. Keep the groin sites clean and dry. You do not need to apply ointments or bandages to the area.   · Wear loose clothes and loose underwear.  · Do not take a bath or submerge your groin area (for example: Jacuzzi, swimming pool, or lake) or at least 1 week or when the puncture sites in your groin have completely healed.     If bleeding should occur at the groin sites following discharge:  · If oozing from groin site occurs, lay down and apply pressure to the puncture site with your fingers without letting up for 15 minutes and lay flat for 1 hour. If bleeding has resolved, you can continue to monitor. If the bleeding continues or there is significant swelling or pain in the groin area, please call 911 immediately and visit the nearest ER for evaluation and treatment. Do not drive yourself to the hospital. DO NOT STOP TAKING YOUR BLOOD THINNER UNLESS INSTRUCTED BY A PHYSICIAN.     Activity Instructions:  · Do not drive or operate any dangerous machinery for 24 hours, but optimally 48-72 hours since you were given general anesthesia.   · Do not strain during bowel movements for the first 3 to 4 days after the procedure to prevent bleeding from the groin sites.  · Avoid heavy lifting (more than 10 pounds) and pushing or pulling heavy objects for the first 5 to 7 days after the procedure.  · Do not participate in strenuous activities for 5 days after the procedure. This includes most sports - jogging, golfing, play tennis, and bowling.  · You may climb stairs if needed, but walk up and down the stairs more slowly than usual.  · Gradually increase your activities until you reach  your normal activity level within one week after the procedure.    Go to the Emergency Department if you develop:   · Change in color or temperature of the leg  · Redness, warmth, or drainage/pus at the groin sites  · Chills or fever greater than 101.0 F   · Severe bleeding or swelling at the groin sites  · Acute Weakness or numbness   · Visual, gait or speech disturbances   · New chest pain, palpitations, shortness of breath, fainting     Medications:  Reconciled Home Medications:      Medication List        CHANGE how you take these medications      flecainide 100 MG Tab  Commonly known as: TAMBOCOR  Take 1 tablet (100 mg total) by mouth every 12 (twelve) hours.  What changed:   medication strength  how much to take            CONTINUE taking these medications      cholecalciferol (vitamin D3) 10 mcg (400 unit) Cap capsule  Vitamin D3   2 daily     ELIQUIS 5 mg Tab  Generic drug: apixaban  Take 5 mg by mouth 2 (two) times daily.     erythromycin ophthalmic ointment  Commonly known as: ROMYCIN     estradioL 0.01 % (0.1 mg/gram) vaginal cream  Commonly known as: ESTRACE  Place vaginally.     metoprolol tartrate 25 MG tablet  Commonly known as: LOPRESSOR  States only takes as need for afib     PREVALITE 4 gram Pwpk  Generic drug: cholestyramine-aspartame     PROLIA 60 mg/mL Syrg  Generic drug: denosumab  Inject 60 mg into the skin.            Plan:   -Resume Eliquis tonight   -Continue flecainide at 100 mg BID.   -Continue metoprolol tartrate as needed for afib/tachycardia   -Continue all other home medications with no changes  -Follow up with Dr. Plummer in 3 months, sooner if needed.     Time spent on the discharge of patient: 30 minutes    NAIN Bradford  Cardiology  [facility]    Attending: Dr. Tam Plummer

## 2025-04-15 NOTE — ANESTHESIA PROCEDURE NOTES
Intubation    Date/Time: 4/15/2025 7:29 AM    Performed by: Jame Jamison CRNA  Authorized by: Mike Quinteros MD    Intubation:     Induction:  Intravenous    Intubated:  Postinduction    Mask Ventilation:  Easy mask    Attempts:  1    Attempted By:  CRNA    Method of Intubation:  Video laryngoscopy    Blade:  Sierra 3    Laryngeal View Grade: Grade I - full view of cords      Difficult Airway Encountered?: No      Complications:  None    Airway Device:  Oral endotracheal tube    Airway Device Size:  7.5    Style/Cuff Inflation:  Cuffed (inflated to minimal occlusive pressure)    Tube secured:  22    Secured at:  The lips    Placement Verified By:  Capnometry    Complicating Factors:  None    Findings Post-Intubation:  BS equal bilateral and atraumatic/condition of teeth unchanged

## 2025-04-15 NOTE — ANESTHESIA PROCEDURE NOTES
Arterial    Diagnosis: a afib    Patient location during procedure: done in OR    Staffing  Authorizing Provider: Mike Quinteros MD  Performing Provider: Mike Quinteros MD    Staffing  Performed by: Mike Quinteros MD  Authorized by: Mike Quinteros MD    Anesthesiologist was present at the time of the procedure.  Arterial  Skin Prep: chlorhexidine gluconate  Local Infiltration: none  Orientation: right  Location: radial    Catheter Size: 20 G  Catheter placement by Ultrasound guidance. Heme positive aspiration all ports.   Vessel Caliber: patent  Needle advanced into vessel with real time Ultrasound guidance.Insertion Attempts: 1  Assessment  Dressing: secured with tape and tegaderm

## 2025-04-15 NOTE — HPI
Joanna Alvarez is a 70 y.o. female with     History obtained from previous visits as well as through patient report.    Background:     From last office visit with Dr. Plummer on 2/3/2025.     69 yo female with atrial fibrillation, Htn, Breast cancer (bilateral mastectomy for rt ductal carcinoma in-situ 2015), transient global amnesia, Lung cancer (RULectomy), left hip replacement.  Seen by Dr. Kim.  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     Has had paroxysmal symptomatic atrial fibrillation, initially diagnosed 12/17/20.  Placed on metoprolol >> noted to be bradycardic. Ultimately, this was discontinued and Flecainide added (2021).  Has continued to have paroxysmal AF, increasing in nature. Utilizes PRN metoprolol. Longest episode over the past year has been 8 hours.  Echo 8/5/22 normal biventricular structure and function     Rt upper lobectomy 6/22 for Adenocarcinoma    2/3/2025: ECG revealed nsr with incomplete RBBB.   1 cup of coffee daily.  Rare alcohol.  Walks 2 miles many times a week.  Has been diagnosed with mild sleep apnea, and trial of CPAP was recommended. Not on regularly. Sleep test re-ordered.   On Eliquis 5 mg BID.  Paroxysmal symptomatic atrial fibrillation, refractory to Flecainide.  Recommend PVI. Risks and benefits of PVI and posterior wall isolation with PFA.  She would like to proceed.  SISI day of procedure, cancel if in nsr.  Hold Eliquis morning of procedure.  Hold Flecainide 3 days prior.     In interim, increase Flecainide to 150 mg BID.    Interval Hx:     Joanna Alvarez presents today to [facility] for scheduled PFA-PVI with Dr. Plummer. She denies any chest pain, palpitations, SOB, LARA, dizziness, light headedness, weakness, syncope, or near syncopal episodes. She denies any bleeding, infections, fevers, rashes, or surgeries in the past 30 days. She is currently taking Eliquis. Last dose of Eliquis was on 4/14/2025 PM.    ECG today shows SB at 51  bpm  ms QRS 96 ms QT/Qtc 450/414 ms.    Pertinent labs reviewed from 4/8/2025. INR 1.0, Hgb 14.6, Cr 0.68    Echo  Result Date: 2/13/2025    Left Ventricle: The left ventricle is normal in size. Normal wall   thickness. There is normal systolic function with a visually estimated   ejection fraction of 55 - 60%. There is normal diastolic function.    Right Ventricle: Normal right ventricular cavity size. Systolic   function is normal.    Left Atrium: Left atrium is moderately dilated. The left atrium volume   index MOD is 42 mL/m2.    Aortic Valve: The aortic valve is a trileaflet valve. There is mild   aortic regurgitation. No significant stenosis.    Mitral Valve: There is mild regurgitation with a posterolateral   eccentrically directed jet.    Tricuspid Valve: There is mild regurgitation.    Pulmonary Artery: The estimated pulmonary artery systolic pressure is   28 mmHg.    IVC/SVC: Normal venous pressure at 3 mmHg.    Pericardium: There is no pericardial effusion.

## 2025-04-15 NOTE — SUBJECTIVE & OBJECTIVE
Past Medical History:   Diagnosis Date    A-fib     Amnesia, global, transient     Asymptomatic varicose veins of unspecified lower extremity     left leg    Breast cancer 05/2015    right  dcis     Cancer 2015    rt breast, DCIS    Cataracts, bilateral     Colon polyp     Cystocele     Diverticulosis     Hemangioma of liver     x 6    Lipoma     Lung cancer     Pelvic floor weakness     pelvic floor prolapse    Psoriasis     Sleep apnea        Past Surgical History:   Procedure Laterality Date    AUGMENTATION OF BREAST Bilateral 05/2015    BREAST BIOPSY Right 04/2018    BREAST BIOPSY Right 05/2015    dcis    BREAST BIOPSY Right 03/2016    benign    BREAST SURGERY Bilateral 2015    bilateral mastectomy with implants gel implants    COLONOSCOPY      5 years ago    HERNIA REPAIR Left 1995    LIPOMA RESECTION Right 2015    LOBECTOMY Right     upper lobe    MASTECTOMY Bilateral 2015    with bilateral silicone gel implants and nipple sparing       Review of patient's allergies indicates:  No Known Allergies    No current facility-administered medications on file prior to encounter.     Current Outpatient Medications on File Prior to Encounter   Medication Sig    cholecalciferol, vitamin D3, 10 mcg (400 unit) Cap capsule Vitamin D3   2 daily    ELIQUIS 5 mg Tab Take 5 mg by mouth 2 (two) times daily.    erythromycin (ROMYCIN) ophthalmic ointment     PREVALITE 4 gram PwPk     cholestyramine-aspartame (QUESTRAN LIGHT) 4 gram PwPk Take 1 packet by mouth.    denosumab (PROLIA) 60 mg/mL Syrg Inject 60 mg into the skin.    estradioL (ESTRACE) 0.01 % (0.1 mg/gram) vaginal cream Place vaginally.    flecainide (TAMBOCOR) 150 MG Tab Take 1 tablet (150 mg total) by mouth every 12 (twelve) hours. (Patient taking differently: Take 150 mg by mouth every 12 (twelve) hours. States currently takes 100 mg every morning and 150 mg nightly)    metoprolol tartrate (LOPRESSOR) 25 MG tablet States only takes as need for afib    predniSONE  (DELTASONE) 10 MG tablet      Family History       Problem Relation (Age of Onset)    Cancer Brother    Heart disease Father, Brother          Tobacco Use    Smoking status: Never    Smokeless tobacco: Never   Substance and Sexual Activity    Alcohol use: Yes     Alcohol/week: 1.0 standard drink of alcohol     Types: 1 Standard drinks or equivalent per week     Comment:  socially    Drug use: No    Sexual activity: Not Currently     Review of Systems   Constitutional: Negative. Negative for chills, fever and malaise/fatigue.   HENT:  Negative for nosebleeds.    Cardiovascular:  Negative for chest pain, dyspnea on exertion, near-syncope, palpitations and syncope.   Respiratory:  Negative for shortness of breath.    Hematologic/Lymphatic: Does not bruise/bleed easily.   Skin:  Negative for itching and rash.   Musculoskeletal: Negative.    Gastrointestinal:  Negative for abdominal pain and nausea.   Genitourinary:  Negative for hematuria.   Neurological:  Negative for dizziness and light-headedness.   Psychiatric/Behavioral:  Negative for altered mental status.    All other systems reviewed and are negative.    Objective:     Vital Signs (Most Recent):  Temp: 97.9 °F (36.6 °C) (04/15/25 0604)  Pulse: (!) 53 (04/15/25 0604)  Resp: 20 (04/15/25 0604)  BP: (!) 154/71 (04/15/25 0604)  SpO2: 100 % (04/15/25 0604) Vital Signs (24h Range):  Temp:  [97.9 °F (36.6 °C)] 97.9 °F (36.6 °C)  Pulse:  [53] 53  Resp:  [20] 20  SpO2:  [100 %] 100 %  BP: (154)/(71) 154/71     Weight: 63.5 kg (140 lb)  Body mass index is 22.6 kg/m².    SpO2: 100 %       No intake or output data in the 24 hours ending 04/15/25 0650    Lines/Drains/Airways       Peripheral Intravenous Line  Duration                  Peripheral IV - Single Lumen 04/15/25 0631 20 G Left Forearm <1 day         Peripheral IV - Single Lumen 04/15/25 0631 20 G Left;Posterior Hand <1 day                     Physical Exam  Vitals and nursing note reviewed.   Constitutional:        General: She is not in acute distress.     Appearance: Normal appearance.   HENT:      Head: Normocephalic.      Mouth/Throat:      Mouth: Mucous membranes are moist.   Eyes:      Extraocular Movements: Extraocular movements intact.   Cardiovascular:      Rate and Rhythm: Regular rhythm. Bradycardia present.      Pulses:           Dorsalis pedis pulses are 1+ on the right side and 1+ on the left side.   Pulmonary:      Effort: Pulmonary effort is normal. No respiratory distress.      Breath sounds: No wheezing or rales.   Abdominal:      General: There is no distension.      Palpations: Abdomen is soft.   Musculoskeletal:         General: Normal range of motion.      Cervical back: Normal range of motion.   Skin:     General: Skin is warm.      Capillary Refill: Capillary refill takes less than 2 seconds.   Neurological:      General: No focal deficit present.      Mental Status: She is alert.   Psychiatric:         Mood and Affect: Mood normal.         Behavior: Behavior normal.         Thought Content: Thought content normal.         Judgment: Judgment normal.          Significant Labs: Pertinent pre-procedure labs reviewed    Significant Imaging: ECG

## 2025-04-15 NOTE — H&P
[facility] - Short Stay Cardiac Unit  Cardiology  History and Physical     Patient Name: Joanna Alvarez  MRN: [id number]  Admission Date: 4/15/2025  Code Status: No Order   Attending Provider: Tam Plummer MD   Primary Care Physician: Jj Gerardo MD  Principal Problem:Paroxysmal atrial fibrillation    Patient information was obtained from patient, spouse/SO, and past medical records.     Subjective:     Chief Complaint: Paroxysmal atrial fibrillation     HPI:  Joanna Alvarez is a 70 y.o. female with atrial fibrillation, Htn, Breast cancer (bilateral mastectomy for rt ductal carcinoma in-situ 2015), transient global amnesia, Lung cancer (RULectomy), left hip replacement.    History obtained from previous visits as well as through patient report.    Background:     From last office visit with Dr. Plummer on 2/3/2025.     71 yo female with atrial fibrillation, Htn, Breast cancer (bilateral mastectomy for rt ductal carcinoma in-situ 2015), transient global amnesia, Lung cancer (RULectomy), left hip replacement.  Seen by Dr. Kim.  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     Has had paroxysmal symptomatic atrial fibrillation, initially diagnosed 12/17/20.  Placed on metoprolol >> noted to be bradycardic. Ultimately, this was discontinued and Flecainide added (2021).  Has continued to have paroxysmal AF, increasing in nature. Utilizes PRN metoprolol. Longest episode over the past year has been 8 hours.  Echo 8/5/22 normal biventricular structure and function     Rt upper lobectomy 6/22 for Adenocarcinoma    2/3/2025: ECG revealed nsr with incomplete RBBB.   1 cup of coffee daily.  Rare alcohol.  Walks 2 miles many times a week.  Has been diagnosed with mild sleep apnea, and trial of CPAP was recommended. Not on regularly. Sleep test re-ordered.   On Eliquis 5 mg BID.  Paroxysmal symptomatic atrial fibrillation, refractory to Flecainide.  Recommend PVI. Risks and benefits of PVI  and posterior wall isolation with PFA.  She would like to proceed.  SISI day of procedure, cancel if in nsr.  Hold Eliquis morning of procedure.  Hold Flecainide 3 days prior.     In interim, increase Flecainide to 150 mg BID.    Interval Hx:     Joanna Alvarez presents today to [facility] for scheduled PFA-PVI with Dr. Plummer. She denies any chest pain, palpitations, SOB, LARA, dizziness, light headedness, weakness, syncope, or near syncopal episodes. She denies any bleeding, infections, fevers, rashes, or surgeries in the past 30 days. She is currently taking Eliquis. Last dose of Eliquis was on 4/14/2025 PM.    ECG today shows SB at 51 bpm  ms QRS 96 ms QT/Qtc 450/414 ms.    Pertinent labs reviewed from 4/8/2025. INR 1.0, Hgb 14.6, Cr 0.68    Echo  Result Date: 2/13/2025    Left Ventricle: The left ventricle is normal in size. Normal wall   thickness. There is normal systolic function with a visually estimated   ejection fraction of 55 - 60%. There is normal diastolic function.    Right Ventricle: Normal right ventricular cavity size. Systolic   function is normal.    Left Atrium: Left atrium is moderately dilated. The left atrium volume   index MOD is 42 mL/m2.    Aortic Valve: The aortic valve is a trileaflet valve. There is mild   aortic regurgitation. No significant stenosis.    Mitral Valve: There is mild regurgitation with a posterolateral   eccentrically directed jet.    Tricuspid Valve: There is mild regurgitation.    Pulmonary Artery: The estimated pulmonary artery systolic pressure is   28 mmHg.    IVC/SVC: Normal venous pressure at 3 mmHg.    Pericardium: There is no pericardial effusion.      Past Medical History:   Diagnosis Date    A-fib     Amnesia, global, transient     Asymptomatic varicose veins of unspecified lower extremity     left leg    Breast cancer 05/2015    right  dcis     Cancer 2015    rt breast, DCIS    Cataracts, bilateral     Colon polyp     Cystocele     Diverticulosis      Hemangioma of liver     x 6    Lipoma     Lung cancer     Pelvic floor weakness     pelvic floor prolapse    Psoriasis     Sleep apnea        Past Surgical History:   Procedure Laterality Date    AUGMENTATION OF BREAST Bilateral 05/2015    BREAST BIOPSY Right 04/2018    BREAST BIOPSY Right 05/2015    dcis    BREAST BIOPSY Right 03/2016    benign    BREAST SURGERY Bilateral 2015    bilateral mastectomy with implants gel implants    COLONOSCOPY      5 years ago    HERNIA REPAIR Left 1995    LIPOMA RESECTION Right 2015    LOBECTOMY Right     upper lobe    MASTECTOMY Bilateral 2015    with bilateral silicone gel implants and nipple sparing       Review of patient's allergies indicates:  No Known Allergies    No current facility-administered medications on file prior to encounter.     Current Outpatient Medications on File Prior to Encounter   Medication Sig    cholecalciferol, vitamin D3, 10 mcg (400 unit) Cap capsule Vitamin D3   2 daily    ELIQUIS 5 mg Tab Take 5 mg by mouth 2 (two) times daily.    erythromycin (ROMYCIN) ophthalmic ointment     PREVALITE 4 gram PwPk     cholestyramine-aspartame (QUESTRAN LIGHT) 4 gram PwPk Take 1 packet by mouth.    denosumab (PROLIA) 60 mg/mL Syrg Inject 60 mg into the skin.    estradioL (ESTRACE) 0.01 % (0.1 mg/gram) vaginal cream Place vaginally.    flecainide (TAMBOCOR) 150 MG Tab Take 1 tablet (150 mg total) by mouth every 12 (twelve) hours. (Patient taking differently: Take 150 mg by mouth every 12 (twelve) hours. States currently takes 100 mg every morning and 150 mg nightly)    metoprolol tartrate (LOPRESSOR) 25 MG tablet States only takes as need for afib    predniSONE (DELTASONE) 10 MG tablet      Family History       Problem Relation (Age of Onset)    Cancer Brother    Heart disease Father, Brother          Tobacco Use    Smoking status: Never    Smokeless tobacco: Never   Substance and Sexual Activity    Alcohol use: Yes     Alcohol/week: 1.0 standard drink of alcohol      Types: 1 Standard drinks or equivalent per week     Comment:  socially    Drug use: No    Sexual activity: Not Currently     Review of Systems   Constitutional: Negative. Negative for chills, fever and malaise/fatigue.   HENT:  Negative for nosebleeds.    Cardiovascular:  Negative for chest pain, dyspnea on exertion, near-syncope, palpitations and syncope.   Respiratory:  Negative for shortness of breath.    Hematologic/Lymphatic: Does not bruise/bleed easily.   Skin:  Negative for itching and rash.   Musculoskeletal: Negative.    Gastrointestinal:  Negative for abdominal pain and nausea.   Genitourinary:  Negative for hematuria.   Neurological:  Negative for dizziness and light-headedness.   Psychiatric/Behavioral:  Negative for altered mental status.    All other systems reviewed and are negative.    Objective:     Vital Signs (Most Recent):  Temp: 97.9 °F (36.6 °C) (04/15/25 0604)  Pulse: (!) 53 (04/15/25 0604)  Resp: 20 (04/15/25 0604)  BP: (!) 154/71 (04/15/25 0604)  SpO2: 100 % (04/15/25 0604) Vital Signs (24h Range):  Temp:  [97.9 °F (36.6 °C)] 97.9 °F (36.6 °C)  Pulse:  [53] 53  Resp:  [20] 20  SpO2:  [100 %] 100 %  BP: (154)/(71) 154/71     Weight: 63.5 kg (140 lb)  Body mass index is 22.6 kg/m².    SpO2: 100 %     Lines/Drains/Airways       Peripheral Intravenous Line  Duration                  Peripheral IV - Single Lumen 04/15/25 0631 20 G Left Forearm <1 day         Peripheral IV - Single Lumen 04/15/25 0631 20 G Left;Posterior Hand <1 day                  Physical Exam  Vitals and nursing note reviewed.   Constitutional:       General: She is not in acute distress.     Appearance: Normal appearance.   HENT:      Head: Normocephalic.      Mouth/Throat:      Mouth: Mucous membranes are moist.   Eyes:      Extraocular Movements: Extraocular movements intact.   Cardiovascular:      Rate and Rhythm: Regular rhythm. Bradycardia present.      Pulses:           Dorsalis pedis pulses are 1+ on the right side  and 1+ on the left side.   Pulmonary:      Effort: Pulmonary effort is normal. No respiratory distress.      Breath sounds: No wheezing or rales.   Abdominal:      General: There is no distension.      Palpations: Abdomen is soft.   Musculoskeletal:         General: Normal range of motion.      Cervical back: Normal range of motion.   Skin:     General: Skin is warm.      Capillary Refill: Capillary refill takes less than 2 seconds.   Neurological:      General: No focal deficit present.      Mental Status: She is alert.   Psychiatric:         Mood and Affect: Mood normal.         Behavior: Behavior normal.         Thought Content: Thought content normal.         Judgment: Judgment normal.     Significant Labs: Pertinent pre-procedure labs reviewed    Significant Imaging: ECG  Assessment and Plan:     Paroxysmal atrial fibrillation    Plan:   -PFA-PVI  -Anesthesia for sedation   -SISI deferred, pt in SB    Prior to procedure, Dr. Plummer at bedside speaking with patient and family. Extensive discussion with patient regarding the nature of RFA PVI including transseptal puncture. We discussed risks and benefits at length, of both PFA vs RFA energy sources. Our discussion included, but was not limited to the risk of death, infection, bleeding, stroke, MI, cardiac perforation, embolism, cardiac tamponade, vascular injury, valvular injury, AE fistula (RFA), injury to phrenic nerve (RFA), pulmonary vein stenosis (RFA), rhabdomyolysis (PFA), hemolysis (PFA) and other organic injury including the possibility for need for surgery or pacemaker implantation. We discussed success rates and possible need for redo procedures. Patient verbalized understanding. All questions answered, no further questions or concerns, patient would like to proceed. Consents signed in clinic. Signature verified today.      NAIN Bradford  Cardiology   [facility] - Short Stay Cardiac Unit    Attending: Dr. Tam Plummer

## 2025-04-15 NOTE — PROGRESS NOTES
..Nursing Transfer Note      Reason patient is being transferred: EP procedure care complete     Transfer To: [facility] 3    Transfer via stretcher    Transfer with cardiac monitoring    Transported by Jose L HENAO     Medicines sent: n/a    Any special needs or follow-up needed: bed rest    Chart send with patient: Yes    Notified: spouse    Patient reassessed at: 7369 4/15/24 (date, time)

## 2025-04-15 NOTE — TRANSFER OF CARE
"Anesthesia Transfer of Care Note    Patient: Joanna Alvarez    Procedure(s) Performed: Procedure(s) (LRB):  Ablation atrial fibrillation (N/A)    Patient location: Other: EP postop    Anesthesia Type: general    Transport from OR: Transported from OR on 6-10 L/min O2 by face mask with adequate spontaneous ventilation    Post pain: adequate analgesia    Post assessment: no apparent anesthetic complications and tolerated procedure well    Post vital signs: stable    Level of consciousness: awake    Nausea/Vomiting: no nausea/vomiting    Complications: none    Transfer of care protocol was followed      Last vitals: Visit Vitals  BP (!) 154/71 (BP Location: Left arm, Patient Position: Lying)   Pulse (!) 53   Temp 36.6 °C (97.9 °F) (Temporal)   Resp 20   Ht 5' 6" (1.676 m)   Wt 63.5 kg (140 lb)   SpO2 100%   Breastfeeding No   BMI 22.60 kg/m²     "

## 2025-04-15 NOTE — HOSPITAL COURSE
Patient underwent PFA-PVI (see procedure note for details), tolerated procedure well with no acute complications. Admitted to PACU, post-procedure ECG showed *** at *** bpm IL *** ms QRS *** ms QT/QTc *** ms. Bilateral groin sites with sutures removed, dressings C/D/I. No bleeding, hematoma, or bruit noted. Bedrest completed x 4 hours. She was monitored on telemetry, no acute arrhythmias.     Patient ambulating, tolerating PO intake, and voiding with no issues. Denies any chest pain or SOB. Discharge plans discussed with Dr. Plummer. Patient to continue all home medications including AAD *** and Eliquis for CVA prophylaxis. Follow up with Dr. Plummer in 3 months. She was assessed at bedside prior to discharge. She reported feeling well and denied chest discomfort, shortness of breath, palpitations, lightheadedness, or any other acute symptoms. Discharge plans/instructions discussed with patient and  who verbalized understanding and agreement of plans of care. No further questions or concerns voiced at this time. She was discharged home in stable condition.     Physical Exam:   Gen: No acute distress, pleasant patient answering questions appropriately  CVS: Regular rate and rhythm  CHEST: Breathing even and unlabored.   ABD: Soft, non-tender, nondistended  GROINS:Bilateral groin sites soft, non tender, no bleeding, no swelling, no hematoma. Dressings to bilateral groin C/D/I  Neurologic: Normal strength and tone. No focal numbness or weakness.   Psychiatric: Normal mood and affect. Behavior is normal. Alert and oriented X 3.  EXT: No Edema

## 2025-04-17 NOTE — ANESTHESIA POSTPROCEDURE EVALUATION
Anesthesia Post Evaluation    Patient: Joanna Alvarez    Procedure(s) Performed: Procedure(s) (LRB):  Ablation atrial fibrillation (N/A)    Final Anesthesia Type: general      Patient participation: Yes- Able to Participate  Level of consciousness: awake and alert  Post-procedure vital signs: reviewed and stable  Pain control: Pain has been treated.  Airway patency: patent    PONV status: Absent or treated.  Anesthetic complications: no      Cardiovascular status: hemodynamically stable  Respiratory status: unassisted  Hydration status: euvolemic  Follow-up not needed.              Vitals Value Taken Time   /60 04/15/25 15:00   Temp 36.1 °C (97 °F) 04/15/25 11:15   Pulse 73 04/15/25 15:00   Resp 16 04/15/25 11:15   SpO2 99 % 04/15/25 11:15         No case tracking events are documented in the log.      Pain/Stefan Score: Stefan Score: 10 (4/15/2025 12:45 PM)

## 2025-04-23 DIAGNOSIS — I48.0 PAROXYSMAL ATRIAL FIBRILLATION: Primary | ICD-10-CM

## 2025-05-29 ENCOUNTER — PATIENT MESSAGE (OUTPATIENT)
Dept: ELECTROPHYSIOLOGY | Facility: CLINIC | Age: 70
End: 2025-05-29
Payer: MEDICARE

## 2025-07-09 PROBLEM — Z79.01 ON ANTICOAGULANT THERAPY: Status: ACTIVE | Noted: 2025-07-09

## 2025-07-09 NOTE — PROGRESS NOTES
Ms. Alvarez is a patient of Dr. Plummer and was last seen in clinic 2/3/2025.      Subjective:   Patient ID:  Joanna Alvarez is a 70 y.o. female who presents for follow up of Atrial Fibrillation  .     HPI:    Ms. Alvarez is a 70 y.o. female with AF (PVI/PWI utilizing PFA), HTN, breast CA (bilateral mastectomy for rt ductal carcinoma in-situ 2015), lung CA (s/p rt upper lobectomy 6/2022), left hip replacement here for follow up after ablation.    Background:    Seen by Dr. Kim.  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    Has had paroxysmal symptomatic atrial fibrillation, initially diagnosed 12/17/20.  Placed on metoprolol >> noted to be bradycardic. Ultimately, this was discontinued and Flecainide added (2021).  Has continued to have paroxysmal AF, increasing in nature. Utilizes PRN metoprolol. Longest episode over the past year has been 8 hours.  Echo 8/5/22 normal biventricular structure and function    Rt upper lobectomy 6/22 for Adenocarcinoma    Paroxysmal symptomatic atrial fibrillation, refractory to Flecainide.  Recommend PVI. Risks and benefits of PVI and posterior wall isolation with PFA.  She would like to proceed.  SISI day of procedure, cancel if in nsr.  Hold Eliquis morning of procedure.  Hold Flecainide 3 days prior.    In interim, increase Flecainide to 150 mg BID.    Update (07/16/2025):    4/15/2025:    Successful pulmonary vein RF isolation with PFA.    3D mapping performed with Ensite.    Intracardiac echo.    Posterior wall isolation.     Today she says she has been feeling well since procedure. No palpitations. Smartwatch indicates no new AF. No CP, LARA, LH, syncope reported.     She is currently taking eliquis 5mg BID for stroke prophylaxis. Hx of microscopic hematuria. She is currently being treated with flecainide 100mg BID for rhythm control and lopressor 25mg PRN for HR control.  Kidney function is stable, with a creatinine of 0.68 on 4/8/2025.    I  have personally reviewed the patient's EKG today, which shows SR with RBBB at 52bpm. MT interval is 172. QRS is 162. QT is 496.    Relevant Cardiac Test Results:    2D Echo (2/12/2025):    Left Ventricle: The left ventricle is normal in size. Normal wall thickness. There is normal systolic function with a visually estimated ejection fraction of 55 - 60%. There is normal diastolic function.    Right Ventricle: Normal right ventricular cavity size. Systolic function is normal.    Left Atrium: Left atrium is moderately dilated. The left atrium volume index MOD is 42 mL/m2.    Aortic Valve: The aortic valve is a trileaflet valve. There is mild aortic regurgitation. No significant stenosis.    Mitral Valve: There is mild regurgitation with a posterolateral eccentrically directed jet.    Tricuspid Valve: There is mild regurgitation.    Pulmonary Artery: The estimated pulmonary artery systolic pressure is 28 mmHg.    IVC/SVC: Normal venous pressure at 3 mmHg.    Pericardium: There is no pericardial effusion.    Current Outpatient Medications   Medication Sig    cholecalciferol, vitamin D3, 10 mcg (400 unit) Cap capsule Vitamin D3   2 daily    denosumab (PROLIA) 60 mg/mL Syrg Inject 60 mg into the skin.    ELIQUIS 5 mg Tab Take 5 mg by mouth 2 (two) times daily.    erythromycin (ROMYCIN) ophthalmic ointment     estradioL (ESTRACE) 0.01 % (0.1 mg/gram) vaginal cream Place vaginally.    flecainide (TAMBOCOR) 100 MG Tab Take 1 tablet (100 mg total) by mouth every 12 (twelve) hours.    metoprolol tartrate (LOPRESSOR) 25 MG tablet States only takes as need for afib    PREVALITE 4 gram PwPk      No current facility-administered medications for this visit.       Review of Systems   Constitutional: Negative for malaise/fatigue.   Cardiovascular:  Negative for chest pain, dyspnea on exertion, irregular heartbeat, leg swelling and palpitations.   Respiratory:  Negative for shortness of breath.    Hematologic/Lymphatic: Negative for  "bleeding problem.   Skin:  Negative for rash.   Musculoskeletal:  Negative for myalgias.   Gastrointestinal:  Negative for hematemesis, hematochezia and nausea.   Genitourinary:  Negative for hematuria.   Neurological:  Negative for light-headedness.   Psychiatric/Behavioral:  Negative for altered mental status.    Allergic/Immunologic: Negative for persistent infections.       Objective:          /60 (Patient Position: Sitting)   Pulse (!) 52   Ht 5' 6" (1.676 m)   Wt 66.7 kg (147 lb 0.8 oz)   BMI 23.73 kg/m²     Physical Exam  Vitals and nursing note reviewed.   Constitutional:       Appearance: Normal appearance. She is well-developed.   HENT:      Head: Normocephalic.      Nose: Nose normal.   Eyes:      Pupils: Pupils are equal, round, and reactive to light.   Cardiovascular:      Rate and Rhythm: Normal rate and regular rhythm.   Pulmonary:      Effort: No respiratory distress.   Musculoskeletal:         General: Normal range of motion.   Skin:     General: Skin is warm and dry.      Findings: No erythema.   Neurological:      Mental Status: She is alert and oriented to person, place, and time.   Psychiatric:         Speech: Speech normal.         Behavior: Behavior normal.           Lab Results   Component Value Date     04/08/2025    K 4.3 04/08/2025    MG 2.2 09/15/2023    BUN 26 (H) 04/08/2025    CREATININE 0.68 04/08/2025    ALT 21 06/21/2024    AST 34 06/21/2024    HGB 14.6 04/08/2025    HCT 45.2 (H) 04/08/2025       Recent Labs   Lab 04/08/25  1023   INR 1.0       Assessment:     1. Paroxysmal atrial fibrillation    2. Primary hypertension    3. On anticoagulant therapy    4. Encounter for monitoring flecainide therapy        Plan:     In summary, Ms. Alvarez is a 70 y.o. female with AF, HTN, breast CA here for follow up after ablation.  She is 3 mo s/p PVI (PFA) and PWI. SHe is doing well from a rhythm standpoint, with no documented or symptomatic recurrence of arrhythmia since procedure. " Monitors rhythm via watch.  ECG shows SB with RBBB. Will wean off flecainide. Reduce to 50mg BID now with virtual visit/ECG in 8 weeks. ALEMVASc 3 on eliquis. Hx of microscopic hematuria. She had a cystoscopy done through her urologist, which was normal and he recommended follow up with Urology in a year. They believe that the cause of the microscopic hematuria is for being on Eliquis. Her blood count is stable. Continue eliquis for now but consider stopping in the future if she remains arrhythmia-free and monitoring via smartwatch or loop.    Reduce flecainide to 50mg BID  Continue other meds  RTC 2 mo with ECG, virtual, sooner if needed      *A copy of this note has been sent to Dr. Plummer*    Follow up in about 2 months (around 9/16/2025).      ------------------------------------------------------------------    GÓMEZ Cao, NP-C  Cardiac Electrophysiology

## 2025-07-16 ENCOUNTER — HOSPITAL ENCOUNTER (OUTPATIENT)
Dept: CARDIOLOGY | Facility: CLINIC | Age: 70
Discharge: HOME | End: 2025-07-16
Payer: MEDICARE

## 2025-07-16 ENCOUNTER — OFFICE VISIT (OUTPATIENT)
Dept: ELECTROPHYSIOLOGY | Facility: CLINIC | Age: 70
End: 2025-07-16
Payer: MEDICARE

## 2025-07-16 VITALS
DIASTOLIC BLOOD PRESSURE: 60 MMHG | SYSTOLIC BLOOD PRESSURE: 128 MMHG | HEIGHT: 66 IN | WEIGHT: 147.06 LBS | BODY MASS INDEX: 23.64 KG/M2 | HEART RATE: 52 BPM

## 2025-07-16 DIAGNOSIS — I48.0 PAROXYSMAL ATRIAL FIBRILLATION: ICD-10-CM

## 2025-07-16 DIAGNOSIS — Z51.81 ENCOUNTER FOR MONITORING FLECAINIDE THERAPY: ICD-10-CM

## 2025-07-16 DIAGNOSIS — Z79.01 ON ANTICOAGULANT THERAPY: ICD-10-CM

## 2025-07-16 DIAGNOSIS — Z79.899 ENCOUNTER FOR MONITORING FLECAINIDE THERAPY: ICD-10-CM

## 2025-07-16 DIAGNOSIS — I48.0 PAROXYSMAL ATRIAL FIBRILLATION: Primary | ICD-10-CM

## 2025-07-16 DIAGNOSIS — I10 PRIMARY HYPERTENSION: ICD-10-CM

## 2025-07-16 LAB
OHS QRS DURATION: 162 MS
OHS QTC CALCULATION: 461 MS

## 2025-07-16 PROCEDURE — 99214 OFFICE O/P EST MOD 30 MIN: CPT | Mod: S$PBB,,, | Performed by: NURSE PRACTITIONER

## 2025-07-16 PROCEDURE — 93005 ELECTROCARDIOGRAM TRACING: CPT | Mod: PBBFAC | Performed by: INTERNAL MEDICINE

## 2025-07-16 PROCEDURE — 99999 PR PBB SHADOW E&M-EST. PATIENT-LVL III: CPT | Mod: PBBFAC,,, | Performed by: NURSE PRACTITIONER

## 2025-07-16 PROCEDURE — 93010 ELECTROCARDIOGRAM REPORT: CPT | Mod: S$PBB,,, | Performed by: INTERNAL MEDICINE

## 2025-07-16 PROCEDURE — 99213 OFFICE O/P EST LOW 20 MIN: CPT | Mod: PBBFAC,25 | Performed by: NURSE PRACTITIONER

## 2025-07-16 RX ORDER — FLECAINIDE ACETATE 100 MG/1
TABLET ORAL
Start: 2025-07-16

## (undated) DEVICE — PAD RADI FEMORAL

## (undated) DEVICE — NDL TRNSSPTL BRK-1 18GA 71CM

## (undated) DEVICE — CATH MAP BI-D HD SENSOR ENABLE

## (undated) DEVICE — STOPCOCK 3-WAY

## (undated) DEVICE — KIT ENSITE ELECTRODE SURFACE

## (undated) DEVICE — PAD DEFIB CADENCE ADULT R2

## (undated) DEVICE — GUIDEWIRE AMPLATZ XSTIFF 180CM

## (undated) DEVICE — GUIDEWIRE ROSEN VAS JTIP 180CM

## (undated) DEVICE — CABLE CONNECT CATH PFA RX HRD

## (undated) DEVICE — CATH ABLATION PULS FIELD 31MM

## (undated) DEVICE — INTRODUCER HEMOSTASIS 7.5F

## (undated) DEVICE — SHEATH HEMOSTASIS 8.5FR

## (undated) DEVICE — R CATH BIDIRECTIONL DF CRV 7FR

## (undated) DEVICE — DILATOR COONS TAPER 14FR

## (undated) DEVICE — R CATH ACUSON ACUNAV 8FR

## (undated) DEVICE — LINE PRESSURE MONITORING 96IN

## (undated) DEVICE — KIT PROBE COVER WITH GEL

## (undated) DEVICE — SHEATH STEERABLE CLEAR

## (undated) DEVICE — INTRO FAST-CATH SL1 8.5FR 63CM